# Patient Record
Sex: MALE | Race: WHITE | Employment: OTHER | URBAN - METROPOLITAN AREA
[De-identification: names, ages, dates, MRNs, and addresses within clinical notes are randomized per-mention and may not be internally consistent; named-entity substitution may affect disease eponyms.]

---

## 2017-01-04 ENCOUNTER — TELEPHONE (OUTPATIENT)
Dept: INTERNAL MEDICINE | Age: 62
End: 2017-01-04

## 2017-01-04 RX ORDER — SIMVASTATIN 80 MG
TABLET ORAL
Qty: 90 TABLET | Refills: 3 | Status: SHIPPED | OUTPATIENT
Start: 2017-01-04 | End: 2018-02-14 | Stop reason: SDUPTHER

## 2018-02-12 ENCOUNTER — OFFICE VISIT (OUTPATIENT)
Dept: INTERNAL MEDICINE | Age: 63
End: 2018-02-12

## 2018-02-12 VITALS
HEART RATE: 81 BPM | SYSTOLIC BLOOD PRESSURE: 138 MMHG | HEIGHT: 67 IN | OXYGEN SATURATION: 96 % | BODY MASS INDEX: 32.8 KG/M2 | DIASTOLIC BLOOD PRESSURE: 88 MMHG | WEIGHT: 209 LBS

## 2018-02-12 DIAGNOSIS — Z00.00 PREVENTATIVE HEALTH CARE: Primary | ICD-10-CM

## 2018-02-12 DIAGNOSIS — M25.552 LEFT HIP PAIN: ICD-10-CM

## 2018-02-12 DIAGNOSIS — L82.1 SEBORRHEIC KERATOSES: ICD-10-CM

## 2018-02-12 DIAGNOSIS — G43.109 OCULAR MIGRAINE: ICD-10-CM

## 2018-02-12 DIAGNOSIS — K29.70 GASTRITIS WITHOUT BLEEDING, UNSPECIFIED CHRONICITY, UNSPECIFIED GASTRITIS TYPE: ICD-10-CM

## 2018-02-12 DIAGNOSIS — E78.5 HYPERLIPIDEMIA, UNSPECIFIED HYPERLIPIDEMIA TYPE: ICD-10-CM

## 2018-02-12 DIAGNOSIS — R07.89 CHEST PAIN, ATYPICAL: ICD-10-CM

## 2018-02-12 PROCEDURE — 93000 ELECTROCARDIOGRAM COMPLETE: CPT | Performed by: INTERNAL MEDICINE

## 2018-02-12 PROCEDURE — 99396 PREV VISIT EST AGE 40-64: CPT | Performed by: INTERNAL MEDICINE

## 2018-02-12 RX ORDER — CLOTRIMAZOLE AND BETAMETHASONE DIPROPIONATE 10; .64 MG/G; MG/G
CREAM TOPICAL
Qty: 15 G | Refills: 1 | Status: SHIPPED | OUTPATIENT
Start: 2018-02-12 | End: 2019-02-13

## 2018-02-12 ASSESSMENT — PATIENT HEALTH QUESTIONNAIRE - PHQ9
SUM OF ALL RESPONSES TO PHQ9 QUESTIONS 1 & 2: 0
2. FEELING DOWN, DEPRESSED OR HOPELESS: 0
1. LITTLE INTEREST OR PLEASURE IN DOING THINGS: 0
SUM OF ALL RESPONSES TO PHQ QUESTIONS 1-9: 0

## 2018-02-12 ASSESSMENT — ENCOUNTER SYMPTOMS
GASTROINTESTINAL NEGATIVE: 1
RESPIRATORY NEGATIVE: 1
ALLERGIC/IMMUNOLOGIC NEGATIVE: 1
TROUBLE SWALLOWING: 0
EYES NEGATIVE: 1

## 2018-02-12 NOTE — ASSESSMENT & PLAN NOTE
Overall doing well. See problem list. Refer to GI for combination of routine screening and checkup. Labs reviewed.

## 2018-02-12 NOTE — ASSESSMENT & PLAN NOTE
T-Gel helps tear. He also has some ill-defined rash that itches on his abdomen and axilla. Try Lotrisone cream. If no benefit then follow-up with dermatology.

## 2018-02-12 NOTE — ASSESSMENT & PLAN NOTE
Status post referral to orthopedics. Left hip joint OKsome bursitis only. Right hip prosthesis looks good on x-ray per orthopedics.

## 2018-02-12 NOTE — PROGRESS NOTES
SUBJECTIVE:    Patient ID: Jovany Joy is an 63 y.o. male. HPI: Patient here today for the f/u of chronic problems -- see Problem List and associated comments. New issues or complaints include (also see Assessment for more details):  Here for routine checkup. See problem list. Overall he feels well. Problems include occasional chest pain, rare ocular migraine, and a rash. Review of Systems   Constitutional: Negative. Negative for diaphoresis. HENT: Negative. Negative for trouble swallowing. Eyes: Negative. Respiratory: Negative. Cardiovascular: Positive for chest pain. Gastrointestinal: Negative. Genitourinary: Negative. Musculoskeletal: Negative. Skin: Positive for rash. Allergic/Immunologic: Negative. Neurological: Negative. Hematological: Negative. Psychiatric/Behavioral: Negative. OBJECTIVE:    /88 (Site: Left Arm, Position: Sitting, Cuff Size: Medium Adult)   Pulse 81   Ht 5' 7\" (1.702 m)   Wt 209 lb (94.8 kg)   SpO2 96%   BMI 32.73 kg/m²      Physical Exam   Constitutional: He is oriented to person, place, and time. He appears well-developed and well-nourished. No distress. HENT:   Head: Normocephalic and atraumatic. Right Ear: External ear normal.   Left Ear: External ear normal.   Mouth/Throat: Oropharynx is clear and moist. No oropharyngeal exudate. Eyes: EOM are normal. Pupils are equal, round, and reactive to light. Right eye exhibits no discharge. Left eye exhibits no discharge. No scleral icterus. Neck: Normal range of motion. Neck supple. No JVD present. Carotid bruit is not present. No tracheal deviation present. No thyromegaly present. Cardiovascular: Normal rate, regular rhythm, normal heart sounds and intact distal pulses. Exam reveals no gallop. No murmur heard. Pulses:       Carotid pulses are 2+ on the right side, and 2+ on the left side. Radial pulses are 2+ on the right side, and 2+ on the left side. Posterior tibial pulses are 2+ on the right side, and 2+ on the left side. Pulmonary/Chest: Effort normal and breath sounds normal. No stridor. No respiratory distress. He has no wheezes. He has no rales. Abdominal: Soft. Bowel sounds are normal. He exhibits no distension, no abdominal bruit and no mass. There is no hepatosplenomegaly. There is no tenderness. Hernia confirmed negative in the right inguinal area and confirmed negative in the left inguinal area. Genitourinary: Rectum normal, prostate normal, testes normal and penis normal. Rectal exam shows guaiac negative stool. Prostate is not enlarged and not tender. Right testis shows no mass and no tenderness. Left testis shows no mass and no tenderness. Circumcised. Musculoskeletal: He exhibits no edema. Lymphadenopathy:        Head (right side): No submandibular adenopathy present. Head (left side): No submandibular adenopathy present. He has no cervical adenopathy. Right: No inguinal and no supraclavicular adenopathy present. Left: No inguinal and no supraclavicular adenopathy present. Neurological: He is alert and oriented to person, place, and time. He has normal strength. He displays no atrophy and no tremor. No cranial nerve deficit. He exhibits normal muscle tone. Gait normal.   Reflex Scores:       Bicep reflexes are 2+ on the right side and 2+ on the left side. Patellar reflexes are 2+ on the right side and 2+ on the left side. Skin: Rash (Fine papular rashbilateral lower abdomen. Mild and bilateral axilla as well.) noted. He is not diaphoretic. No pallor. Psychiatric: He has a normal mood and affect. His speech is normal and behavior is normal. Judgment and thought content normal. Cognition and memory are normal.       ASSESSMENT:       Encounter Diagnoses   Name Primary?     Hyperlipidemia, unspecified hyperlipidemia type Yes    Preventative health care     Gastritis without bleeding, unspecified

## 2018-02-14 RX ORDER — SIMVASTATIN 80 MG
TABLET ORAL
Qty: 90 TABLET | Refills: 3 | Status: SHIPPED | OUTPATIENT
Start: 2018-02-14 | End: 2019-02-03 | Stop reason: SDUPTHER

## 2018-02-20 ENCOUNTER — OFFICE VISIT (OUTPATIENT)
Dept: DERMATOLOGY | Age: 63
End: 2018-02-20

## 2018-02-20 DIAGNOSIS — L57.8 PHOTOAGING OF SKIN: ICD-10-CM

## 2018-02-20 DIAGNOSIS — L85.3 XEROSIS OF SKIN: ICD-10-CM

## 2018-02-20 DIAGNOSIS — L30.9 HAND ECZEMA: ICD-10-CM

## 2018-02-20 DIAGNOSIS — L73.9 FOLLICULITIS: Primary | ICD-10-CM

## 2018-02-20 DIAGNOSIS — Z78.9 NON-TOBACCO USER: ICD-10-CM

## 2018-02-20 PROCEDURE — 99202 OFFICE O/P NEW SF 15 MIN: CPT | Performed by: DERMATOLOGY

## 2018-02-20 RX ORDER — CICLOPIROX 1 G/100ML
SHAMPOO TOPICAL
Qty: 120 ML | Refills: 2 | Status: SHIPPED | OUTPATIENT
Start: 2018-02-20 | End: 2019-02-13

## 2018-02-20 NOTE — PROGRESS NOTES
[facility]) Dermatology  [address], [place], [address]       Bhavin Muñiz  1955    63 y.o. male     Date of Visit: 2018    Chief Complaint:   Chief Complaint   Patient presents with    New Patient     Itching on scalp, abdomen. PCP prescribed Lotrisol for itching on the abdomen amd it has helped witht he abdomen. Pt. uses T-gel on the scalp and that helps temporary. Itching on back and fingers. I was asked to see this patient by Dr. Paulson ref. provider found. History of Present Illness:  Bhavin Muñiz is a 63 y.o. male who presents with the chief complaint of establish care and for the followin. Complains of intermittent itching to his scalp with small bumps that appear intermittently to top of scalp and back of scalp, occurring for at least one year. He uses over-the-counter Neutrogena T/Gel shampoo to scalp 0-2 times per week as needed and this helps decrease itching temporarily (for about 3 days at one time). 2. Complains of pruritic bumps located to abdomen for several months. Primary care doctor prescribed Lotrisone to apply to his abdomen daily for 1 week and the bumps have resolved. He does not moisturize regularly. He does wash body with Dove soap daily  3. Complains of dry rough feeling hands with mild intermittent itching over the last several months. Patient use Lotrisone cream prescribed for his abdomen to his hands last few days and it is since taking care of the itch. Hands remain dry. Does not moisturize regularly. Does not believe he washes hands too frequently. Also states his legs have flaky skin and itch intermittently. Admits to sun exposure in youth without wearing sunscreen, hats, or protective clothing. Review of Systems:  Constitutional: Reports general sense of well-being   Skin: No new or changing moles, no history of keloids or hypertrophic scars. Heme: No abnormal bruising or bleeding.     Past Medical History, Family History, Surgical daily, do not take more than 1 shower daily with warm water and spend less than 10 minutes in shower/bath, pat dry and then apply thick moisturizer like OTC CeraVe cream in jar twice daily. Avoid fragrances and dyes and use only fragrance free detergents. 4.  Photoaging of skin  -Patient's skin showing evidence of chronic sun exposure leading to diffuse photodamage and photo-aging  -Educated patient that chronic sun exposure leads to increased risk for skin cancers and to have at least annual skin exams (earlier if indicated) in the office.   -The patient was counseled regarding sun protective practices such as sunscreen use (water resistant, broad spectrum sunscreen with SPF rating of at least 30) and need for reapplication at least every 2 hours, sun protective clothing (including hat and sunglasses), avoidance of sun during the peak hours of the day, and avoidance of tanning beds. RTC in 1-2 months for TBSE    5. Non-tobacco user  -continue with tobacco cessation        Note is transcribed using voice recognition software. Inadvertent computerized transcription errors may be present. Return if symptoms worsen or fail to improve.

## 2019-01-04 ENCOUNTER — TELEPHONE (OUTPATIENT)
Dept: INTERNAL MEDICINE CLINIC | Age: 64
End: 2019-01-04

## 2019-01-29 ENCOUNTER — TELEPHONE (OUTPATIENT)
Dept: INTERNAL MEDICINE CLINIC | Age: 64
End: 2019-01-29

## 2019-02-04 RX ORDER — SIMVASTATIN 80 MG
TABLET ORAL
Qty: 90 TABLET | Refills: 3 | Status: SHIPPED | OUTPATIENT
Start: 2019-02-04 | End: 2019-03-13 | Stop reason: SDUPTHER

## 2019-02-05 ENCOUNTER — TELEPHONE (OUTPATIENT)
Dept: FAMILY MEDICINE CLINIC | Age: 64
End: 2019-02-05

## 2019-02-05 NOTE — TELEPHONE ENCOUNTER
I believe he does and he has appt with MD on 2/13/2019 so new insurance will be put in at that time.

## 2019-02-13 ENCOUNTER — OFFICE VISIT (OUTPATIENT)
Dept: INTERNAL MEDICINE CLINIC | Age: 64
End: 2019-02-13
Payer: COMMERCIAL

## 2019-02-13 ENCOUNTER — TELEPHONE (OUTPATIENT)
Dept: FAMILY MEDICINE CLINIC | Age: 64
End: 2019-02-13

## 2019-02-13 VITALS
SYSTOLIC BLOOD PRESSURE: 136 MMHG | OXYGEN SATURATION: 95 % | WEIGHT: 187 LBS | BODY MASS INDEX: 29.35 KG/M2 | DIASTOLIC BLOOD PRESSURE: 82 MMHG | HEIGHT: 67 IN | HEART RATE: 74 BPM

## 2019-02-13 DIAGNOSIS — Z12.11 SCREEN FOR COLON CANCER: ICD-10-CM

## 2019-02-13 DIAGNOSIS — R07.89 CHEST PAIN, ATYPICAL: ICD-10-CM

## 2019-02-13 DIAGNOSIS — E78.5 HYPERLIPIDEMIA, UNSPECIFIED HYPERLIPIDEMIA TYPE: ICD-10-CM

## 2019-02-13 DIAGNOSIS — K29.70 GASTRITIS WITHOUT BLEEDING, UNSPECIFIED CHRONICITY, UNSPECIFIED GASTRITIS TYPE: ICD-10-CM

## 2019-02-13 DIAGNOSIS — Z00.00 PREVENTATIVE HEALTH CARE: Primary | ICD-10-CM

## 2019-02-13 DIAGNOSIS — Z00.00 ROUTINE GENERAL MEDICAL EXAMINATION AT A HEALTH CARE FACILITY: ICD-10-CM

## 2019-02-13 DIAGNOSIS — N52.9 ERECTILE DYSFUNCTION, UNSPECIFIED ERECTILE DYSFUNCTION TYPE: ICD-10-CM

## 2019-02-13 PROCEDURE — 93000 ELECTROCARDIOGRAM COMPLETE: CPT | Performed by: INTERNAL MEDICINE

## 2019-02-13 PROCEDURE — 99396 PREV VISIT EST AGE 40-64: CPT | Performed by: INTERNAL MEDICINE

## 2019-02-13 PROCEDURE — 99213 OFFICE O/P EST LOW 20 MIN: CPT | Performed by: INTERNAL MEDICINE

## 2019-02-13 PROCEDURE — 82270 OCCULT BLOOD FECES: CPT | Performed by: INTERNAL MEDICINE

## 2019-02-13 RX ORDER — SILDENAFIL CITRATE 20 MG/1
TABLET ORAL
Qty: 90 TABLET | Refills: 3 | Status: SHIPPED | OUTPATIENT
Start: 2019-02-13 | End: 2021-01-31

## 2019-02-13 RX ORDER — OMEPRAZOLE 20 MG/1
20 CAPSULE, DELAYED RELEASE ORAL DAILY
COMMUNITY
End: 2021-05-03 | Stop reason: SDUPTHER

## 2019-02-13 ASSESSMENT — ENCOUNTER SYMPTOMS
RESPIRATORY NEGATIVE: 1
ALLERGIC/IMMUNOLOGIC NEGATIVE: 1
GASTROINTESTINAL NEGATIVE: 1

## 2019-02-13 ASSESSMENT — PATIENT HEALTH QUESTIONNAIRE - PHQ9
SUM OF ALL RESPONSES TO PHQ QUESTIONS 1-9: 0
1. LITTLE INTEREST OR PLEASURE IN DOING THINGS: 0
SUM OF ALL RESPONSES TO PHQ9 QUESTIONS 1 & 2: 0
2. FEELING DOWN, DEPRESSED OR HOPELESS: 0
SUM OF ALL RESPONSES TO PHQ QUESTIONS 1-9: 0

## 2019-03-12 ENCOUNTER — TELEPHONE (OUTPATIENT)
Dept: INTERNAL MEDICINE CLINIC | Age: 64
End: 2019-03-12

## 2019-03-13 ENCOUNTER — TELEPHONE (OUTPATIENT)
Dept: INTERNAL MEDICINE CLINIC | Age: 64
End: 2019-03-13

## 2019-03-13 RX ORDER — SIMVASTATIN 80 MG
TABLET ORAL
Qty: 90 TABLET | Refills: 3 | Status: SHIPPED | OUTPATIENT
Start: 2019-03-13 | End: 2020-02-24 | Stop reason: SDUPTHER

## 2019-10-08 RX ORDER — LORAZEPAM 1 MG/1
TABLET ORAL
Qty: 30 TABLET | Refills: 0 | OUTPATIENT
Start: 2019-10-08

## 2019-10-28 ENCOUNTER — TELEPHONE (OUTPATIENT)
Dept: INTERNAL MEDICINE CLINIC | Age: 64
End: 2019-10-28

## 2019-10-29 RX ORDER — IBUPROFEN 200 MG
200 TABLET ORAL EVERY 6 HOURS PRN
COMMUNITY
End: 2022-02-03

## 2019-11-04 ENCOUNTER — ANESTHESIA EVENT (OUTPATIENT)
Dept: ENDOSCOPY | Age: 64
End: 2019-11-04
Payer: COMMERCIAL

## 2019-11-05 ENCOUNTER — ANESTHESIA (OUTPATIENT)
Dept: ENDOSCOPY | Age: 64
End: 2019-11-05
Payer: COMMERCIAL

## 2019-11-05 ENCOUNTER — HOSPITAL ENCOUNTER (OUTPATIENT)
Age: 64
Setting detail: OUTPATIENT SURGERY
Discharge: HOME | End: 2019-11-05
Attending: INTERNAL MEDICINE | Admitting: INTERNAL MEDICINE
Payer: COMMERCIAL

## 2019-11-05 VITALS
DIASTOLIC BLOOD PRESSURE: 71 MMHG | OXYGEN SATURATION: 97 % | SYSTOLIC BLOOD PRESSURE: 101 MMHG | RESPIRATION RATE: 14 BRPM

## 2019-11-05 VITALS
BODY MASS INDEX: 26.39 KG/M2 | WEIGHT: 178.2 LBS | HEART RATE: 63 BPM | DIASTOLIC BLOOD PRESSURE: 71 MMHG | HEIGHT: 69 IN | SYSTOLIC BLOOD PRESSURE: 112 MMHG | TEMPERATURE: 96.8 F | RESPIRATION RATE: 18 BRPM | OXYGEN SATURATION: 98 %

## 2019-11-05 PROCEDURE — 6360000002 HC RX W HCPCS

## 2019-11-05 PROCEDURE — 3700000001 HC ADD 15 MINUTES (ANESTHESIA): Performed by: INTERNAL MEDICINE

## 2019-11-05 PROCEDURE — 7100000010 HC PHASE II RECOVERY - FIRST 15 MIN: Performed by: INTERNAL MEDICINE

## 2019-11-05 PROCEDURE — 2580000003 HC RX 258: Performed by: ANESTHESIOLOGY

## 2019-11-05 PROCEDURE — 7100000011 HC PHASE II RECOVERY - ADDTL 15 MIN: Performed by: INTERNAL MEDICINE

## 2019-11-05 PROCEDURE — 2500000003 HC RX 250 WO HCPCS

## 2019-11-05 PROCEDURE — 3609027000 HC COLONOSCOPY: Performed by: INTERNAL MEDICINE

## 2019-11-05 PROCEDURE — 3700000000 HC ANESTHESIA ATTENDED CARE: Performed by: INTERNAL MEDICINE

## 2019-11-05 RX ORDER — PROPOFOL 10 MG/ML
INJECTION, EMULSION INTRAVENOUS PRN
Status: DISCONTINUED | OUTPATIENT
Start: 2019-11-05 | End: 2019-11-05 | Stop reason: SDUPTHER

## 2019-11-05 RX ORDER — SODIUM CHLORIDE 9 MG/ML
INJECTION, SOLUTION INTRAVENOUS CONTINUOUS
Status: DISCONTINUED | OUTPATIENT
Start: 2019-11-05 | End: 2019-11-05 | Stop reason: HOSPADM

## 2019-11-05 RX ORDER — LIDOCAINE HYDROCHLORIDE 20 MG/ML
INJECTION, SOLUTION EPIDURAL; INFILTRATION; INTRACAUDAL; PERINEURAL PRN
Status: DISCONTINUED | OUTPATIENT
Start: 2019-11-05 | End: 2019-11-05 | Stop reason: SDUPTHER

## 2019-11-05 RX ORDER — SODIUM CHLORIDE 0.9 % (FLUSH) 0.9 %
10 SYRINGE (ML) INJECTION EVERY 12 HOURS SCHEDULED
Status: DISCONTINUED | OUTPATIENT
Start: 2019-11-05 | End: 2019-11-05 | Stop reason: HOSPADM

## 2019-11-05 RX ORDER — ONDANSETRON 2 MG/ML
4 INJECTION INTRAMUSCULAR; INTRAVENOUS
Status: DISCONTINUED | OUTPATIENT
Start: 2019-11-05 | End: 2019-11-05 | Stop reason: HOSPADM

## 2019-11-05 RX ORDER — SODIUM CHLORIDE 0.9 % (FLUSH) 0.9 %
10 SYRINGE (ML) INJECTION PRN
Status: DISCONTINUED | OUTPATIENT
Start: 2019-11-05 | End: 2019-11-05 | Stop reason: HOSPADM

## 2019-11-05 RX ADMIN — LIDOCAINE HYDROCHLORIDE 60 MG: 20 INJECTION, SOLUTION EPIDURAL; INFILTRATION; INTRACAUDAL; PERINEURAL at 07:59

## 2019-11-05 RX ADMIN — PROPOFOL 30 MG: 10 INJECTION, EMULSION INTRAVENOUS at 08:09

## 2019-11-05 RX ADMIN — PROPOFOL 50 MG: 10 INJECTION, EMULSION INTRAVENOUS at 08:07

## 2019-11-05 RX ADMIN — PROPOFOL 50 MG: 10 INJECTION, EMULSION INTRAVENOUS at 08:14

## 2019-11-05 RX ADMIN — PROPOFOL 50 MG: 10 INJECTION, EMULSION INTRAVENOUS at 08:11

## 2019-11-05 RX ADMIN — PROPOFOL 30 MG: 10 INJECTION, EMULSION INTRAVENOUS at 08:04

## 2019-11-05 RX ADMIN — SODIUM CHLORIDE: 0.9 INJECTION, SOLUTION INTRAVENOUS at 07:42

## 2019-11-05 RX ADMIN — PROPOFOL 100 MG: 10 INJECTION, EMULSION INTRAVENOUS at 07:59

## 2019-11-05 RX ADMIN — PROPOFOL 30 MG: 10 INJECTION, EMULSION INTRAVENOUS at 08:01

## 2019-11-05 ASSESSMENT — PAIN SCALES - WONG BAKER
WONGBAKER_NUMERICALRESPONSE: 0

## 2019-11-05 ASSESSMENT — PAIN SCALES - GENERAL
PAINLEVEL_OUTOF10: 0

## 2019-11-05 ASSESSMENT — PAIN - FUNCTIONAL ASSESSMENT: PAIN_FUNCTIONAL_ASSESSMENT: 0-10

## 2020-02-18 ENCOUNTER — OFFICE VISIT (OUTPATIENT)
Dept: INTERNAL MEDICINE CLINIC | Age: 65
End: 2020-02-18
Payer: COMMERCIAL

## 2020-02-18 VITALS
HEART RATE: 77 BPM | SYSTOLIC BLOOD PRESSURE: 110 MMHG | WEIGHT: 178 LBS | BODY MASS INDEX: 27.94 KG/M2 | OXYGEN SATURATION: 97 % | HEIGHT: 67 IN | DIASTOLIC BLOOD PRESSURE: 70 MMHG

## 2020-02-18 PROBLEM — G25.2 ACTION TREMOR: Status: ACTIVE | Noted: 2020-02-18

## 2020-02-18 PROBLEM — G89.29 CHRONIC LEFT SHOULDER PAIN: Status: ACTIVE | Noted: 2020-02-18

## 2020-02-18 PROBLEM — M25.512 CHRONIC LEFT SHOULDER PAIN: Status: ACTIVE | Noted: 2020-02-18

## 2020-02-18 PROBLEM — R07.89 CHEST PAIN, ATYPICAL: Status: RESOLVED | Noted: 2018-02-12 | Resolved: 2020-02-18

## 2020-02-18 PROCEDURE — 99396 PREV VISIT EST AGE 40-64: CPT | Performed by: INTERNAL MEDICINE

## 2020-02-18 PROCEDURE — 99213 OFFICE O/P EST LOW 20 MIN: CPT | Performed by: INTERNAL MEDICINE

## 2020-02-18 SDOH — ECONOMIC STABILITY: TRANSPORTATION INSECURITY
IN THE PAST 12 MONTHS, HAS THE LACK OF TRANSPORTATION KEPT YOU FROM MEDICAL APPOINTMENTS OR FROM GETTING MEDICATIONS?: NO

## 2020-02-18 SDOH — ECONOMIC STABILITY: FOOD INSECURITY: WITHIN THE PAST 12 MONTHS, YOU WORRIED THAT YOUR FOOD WOULD RUN OUT BEFORE YOU GOT MONEY TO BUY MORE.: NEVER TRUE

## 2020-02-18 SDOH — ECONOMIC STABILITY: INCOME INSECURITY: HOW HARD IS IT FOR YOU TO PAY FOR THE VERY BASICS LIKE FOOD, HOUSING, MEDICAL CARE, AND HEATING?: NOT HARD AT ALL

## 2020-02-18 SDOH — ECONOMIC STABILITY: TRANSPORTATION INSECURITY
IN THE PAST 12 MONTHS, HAS LACK OF TRANSPORTATION KEPT YOU FROM MEETINGS, WORK, OR FROM GETTING THINGS NEEDED FOR DAILY LIVING?: NO

## 2020-02-18 SDOH — ECONOMIC STABILITY: FOOD INSECURITY: WITHIN THE PAST 12 MONTHS, THE FOOD YOU BOUGHT JUST DIDN'T LAST AND YOU DIDN'T HAVE MONEY TO GET MORE.: NEVER TRUE

## 2020-02-18 ASSESSMENT — PATIENT HEALTH QUESTIONNAIRE - PHQ9
SUM OF ALL RESPONSES TO PHQ QUESTIONS 1-9: 0
SUM OF ALL RESPONSES TO PHQ QUESTIONS 1-9: 0
SUM OF ALL RESPONSES TO PHQ9 QUESTIONS 1 & 2: 0
2. FEELING DOWN, DEPRESSED OR HOPELESS: 0
1. LITTLE INTEREST OR PLEASURE IN DOING THINGS: 0

## 2020-02-18 ASSESSMENT — ENCOUNTER SYMPTOMS
GASTROINTESTINAL NEGATIVE: 1
TROUBLE SWALLOWING: 0
RESPIRATORY NEGATIVE: 1

## 2020-02-18 NOTE — ASSESSMENT & PLAN NOTE
Noted several months ago. Seems to be improving actually. Mostly a action tremor with use of his arms. Bilateral.  No intervention at this time. Patient willing to monitor.

## 2020-02-18 NOTE — PROGRESS NOTES
SUBJECTIVE:  Patient ID: Philippe Simmons is an 60 y.o. male. HPI: Patient here today for the f/u of chronic problems-- see Problem List and associated comments. New issues or complaints include (alsosee Assessment for more details): Here for his routine yearly checkup. For the most part he is doing very well. He has retired and feels more relaxed. He and his wife will be moving into a new home soon, and he feels he may go back for some type of work afterwards. He has some pain in his left shoulder for a few months. Does not remember any particular injury. His knees, especially his left knee, are giving him more problems lately. His left knee has been treated with arthroscopic surgery in the past.    He has noted intermittent tremor in his arms. This mostly occurs with fine movement and not at rest.  There is no family history for tremors. It started several months ago and he actually believes it is improving. It does not cause any problems in his activities. He occasionally gets some dry skin dermatitis needs refill on his triamcinolone. Colonoscopy done late last year was okay-he needs a repeat in 10 years. Review of Systems   Constitutional: Negative for activity change, appetite change and unexpected weight change. HENT: Negative for trouble swallowing. Eyes: Negative for visual disturbance. Respiratory: Negative. Cardiovascular: Negative. Gastrointestinal: Negative. Genitourinary: Negative for difficulty urinating and dysuria. Musculoskeletal: Positive for arthralgias. Skin: Positive for rash (Dry skin dermatitis). Neurological: Positive for tremors. Negative for headaches. Hematological: Negative. Psychiatric/Behavioral: Negative.         OBJECTIVE:    /70 (Site: Left Upper Arm, Position: Sitting, Cuff Size: Large Adult)   Pulse 77   Ht 5' 7\" (1.702 m)   Wt 178 lb (80.7 kg)   SpO2 97%   BMI 27.88 kg/m²      Physical Exam  Constitutional: General: He is not in acute distress. Appearance: He is well-developed. He is not diaphoretic. HENT:      Head: Normocephalic and atraumatic. Right Ear: External ear normal.      Left Ear: External ear normal.      Mouth/Throat:      Pharynx: No oropharyngeal exudate. Eyes:      General: No scleral icterus. Right eye: No discharge. Left eye: No discharge. Pupils: Pupils are equal, round, and reactive to light. Neck:      Musculoskeletal: Normal range of motion and neck supple. Thyroid: No thyromegaly. Vascular: No carotid bruit or JVD. Trachea: No tracheal deviation. Cardiovascular:      Rate and Rhythm: Normal rate and regular rhythm. Pulses:           Carotid pulses are 2+ on the right side and 2+ on the left side. Radial pulses are 2+ on the right side and 2+ on the left side. Posterior tibial pulses are 2+ on the right side and 2+ on the left side. Heart sounds: Normal heart sounds. No murmur. No gallop. Pulmonary:      Effort: Pulmonary effort is normal. No respiratory distress. Breath sounds: Normal breath sounds. No stridor. No wheezing or rales. Abdominal:      General: Bowel sounds are normal. There is no distension or abdominal bruit. Palpations: Abdomen is soft. There is no mass. Tenderness: There is no abdominal tenderness. Hernia: There is no hernia in the right inguinal area or left inguinal area. Genitourinary:     Penis: Normal and circumcised. Scrotum/Testes: Normal.         Right: Mass or tenderness not present. Left: Mass or tenderness not present. Prostate: Enlarged. Not tender. Rectum: Normal. Guaiac result negative. Musculoskeletal:      Right lower leg: No edema. Left lower leg: No edema. Comments: Some tenderness over left anterior shoulder- pain elicited with arm rotation and abduction greater than 90 degrees. Joint stable. No crepitus. Lymphadenopathy:      Head:      Right side of head: No submandibular adenopathy. Left side of head: No submandibular adenopathy. Cervical: No cervical adenopathy. Upper Body:      Right upper body: No supraclavicular adenopathy. Left upper body: No supraclavicular adenopathy. Lower Body: No right inguinal adenopathy. No left inguinal adenopathy. Skin:     Coloration: Skin is not jaundiced or pale. Findings: No rash. Neurological:      Mental Status: He is alert and oriented to person, place, and time. Cranial Nerves: No cranial nerve deficit. Motor: Tremor present. No atrophy or abnormal muscle tone. Coordination: Coordination normal.      Gait: Gait normal.      Deep Tendon Reflexes:      Reflex Scores:       Bicep reflexes are 2+ on the right side and 2+ on the left side. Patellar reflexes are 2+ on the right side and 2+ on the left side. Comments: Fine tremor bilateral upper extremities and hands with arms outstretched. Slight tremor noted when lifting a full glass of water. No cogwheeling. No spasticity. No rest tremor. Psychiatric:         Speech: Speech normal.         Behavior: Behavior normal.         Thought Content: Thought content normal.         Judgment: Judgment normal.         ASSESSMENT:       Encounter Diagnoses   Name Primary?  Preventative health care Yes    Hand eczema     Gastritis without bleeding, unspecified chronicity, unspecified gastritis type     Hyperlipidemia, unspecified hyperlipidemia type     Chronic left shoulder pain     S/P left knee arthroscopy, chondroplasty, synovectomy, partial medial and lateral meniscectomy 9/4/2015     Erectile dysfunction, unspecified erectile dysfunction type     Action tremor        Preventative health care  Here for routine checkup. Check PSA. Labs were done last year through his wife's insurance.     See problem list.    Colonoscopy done 2019-recheck 10 years. Gastritis  Omeprazole    Hyperlipidemia  Simvastatin-outside labs okay    Chronic left shoulder pain  Suspect rotator cuff or biceps tendinopathy. Refer to orthopedics. S/P left knee arthroscopy, chondroplasty, synovectomy, partial medial and lateral meniscectomy 9/4/2015  Occasionally still gives him some problems. He feels that is slowly worsening over the years. Gave referral for orthopedic evaluation if necessary. ED (erectile dysfunction)  Sildenafil works well. Action tremor  Noted several months ago. Seems to be improving actually. Mostly a action tremor with use of his arms. Bilateral.  No intervention at this time. Patient willing to monitor. PLAN:See ASSESSMENT for evaluation & PLAN     Orders Placed This Encounter   Procedures    TSH WITH REFLEX TO FT4     Standing Status:   Future     Standing Expiration Date:   2/17/2021    Psa screening     Standing Status:   Future     Standing Expiration Date:   2/17/2021       PSH, PMH, SH and FH reviewed and noted. Recent and past labs, tests and consultsalso reviewed. Recent or new meds also reviewed.

## 2020-02-24 ENCOUNTER — TELEPHONE (OUTPATIENT)
Dept: INTERNAL MEDICINE CLINIC | Age: 65
End: 2020-02-24

## 2020-02-24 RX ORDER — SIMVASTATIN 80 MG
TABLET ORAL
Qty: 90 TABLET | Refills: 3 | OUTPATIENT
Start: 2020-02-24 | End: 2021-01-25 | Stop reason: SDUPTHER

## 2020-02-24 RX ORDER — LORAZEPAM 1 MG/1
TABLET ORAL
Qty: 30 TABLET | Refills: 0 | OUTPATIENT
Start: 2020-02-24 | End: 2020-03-24

## 2020-06-17 ENCOUNTER — TELEPHONE (OUTPATIENT)
Dept: ORTHOPEDIC SURGERY | Age: 65
End: 2020-06-17

## 2020-07-29 ENCOUNTER — APPOINTMENT (OUTPATIENT)
Dept: CT IMAGING | Age: 65
End: 2020-07-29
Payer: COMMERCIAL

## 2020-07-29 ENCOUNTER — HOSPITAL ENCOUNTER (EMERGENCY)
Age: 65
Discharge: HOME | End: 2020-07-29
Payer: COMMERCIAL

## 2020-07-29 VITALS
HEART RATE: 76 BPM | BODY MASS INDEX: 26.07 KG/M2 | HEIGHT: 69 IN | TEMPERATURE: 98.2 F | SYSTOLIC BLOOD PRESSURE: 130 MMHG | OXYGEN SATURATION: 98 % | RESPIRATION RATE: 16 BRPM | DIASTOLIC BLOOD PRESSURE: 74 MMHG | WEIGHT: 176 LBS

## 2020-07-29 PROCEDURE — 70450 CT HEAD/BRAIN W/O DYE: CPT

## 2020-07-29 PROCEDURE — 12002 RPR S/N/AX/GEN/TRNK2.6-7.5CM: CPT

## 2020-07-29 PROCEDURE — 72125 CT NECK SPINE W/O DYE: CPT

## 2020-07-29 PROCEDURE — 90471 IMMUNIZATION ADMIN: CPT | Performed by: NURSE PRACTITIONER

## 2020-07-29 PROCEDURE — 99283 EMERGENCY DEPT VISIT LOW MDM: CPT

## 2020-07-29 PROCEDURE — 90715 TDAP VACCINE 7 YRS/> IM: CPT | Performed by: NURSE PRACTITIONER

## 2020-07-29 PROCEDURE — 6360000002 HC RX W HCPCS: Performed by: NURSE PRACTITIONER

## 2020-07-29 RX ADMIN — TETANUS TOXOID, REDUCED DIPHTHERIA TOXOID AND ACELLULAR PERTUSSIS VACCINE, ADSORBED 0.5 ML: 5; 2.5; 8; 8; 2.5 SUSPENSION INTRAMUSCULAR at 18:33

## 2020-07-29 ASSESSMENT — ENCOUNTER SYMPTOMS
DIARRHEA: 0
SHORTNESS OF BREATH: 0
VOMITING: 0
NAUSEA: 0
ABDOMINAL PAIN: 0
CHEST TIGHTNESS: 0

## 2020-07-29 NOTE — ED PROVIDER NOTES
[address] [facility]        Pt Name: Caty Rittre  MRN: [id number]  [place] 1955  Date of evaluation: 7/29/2020  Provider: AVELINO Ricardo - HERSON  PCP: Louis Doty MD    Evaluation by CITLALI. My supervising physician was available for consultation. CHIEF COMPLAINT       Chief Complaint   Patient presents with    Head Laceration     head laceration a wood entertainment center fell on head. denies loc. HISTORY OF PRESENT ILLNESS   (Location, Timing/Onset, Context/Setting, Quality, Duration, Modifying Factors, Severity, Associated Signs and Symptoms)  Note limiting factors. Caty Ritter is a 63 y.o. male presents to the emergency department complaining of left-sided scalp laceration. Patient reports that he was breaking up an entertainment center with a sledgehammer when a piece accidentally slipped, lacerating the left parietal scalp. There is a 4 cm stellate laceration, bleeding controlled. Not on any blood thinners. He is chronic neck pain, no better or worse today. There is no loss of consciousness. Denies any fever, lightheadedness, dizziness, visual disturbances. No chest pain or pressure. No neck pain. No shortness of breath, cough, or congestion. No abdominal pain, nausea, vomiting, diarrhea, constipation, or dysuria. No rash. Nursing Notes were all reviewed and agreed with or any disagreements were addressed in the HPI. REVIEW OF SYSTEMS    (2-9 systems for level 4, 10 or more for level 5)     Review of Systems   Constitutional: Negative for activity change, chills and fever. Respiratory: Negative for chest tightness and shortness of breath. Cardiovascular: Negative for chest pain. Gastrointestinal: Negative for abdominal pain, diarrhea, nausea and vomiting. Genitourinary: Negative for dysuria. Musculoskeletal: Positive for neck pain. Skin: Positive for wound.    All other systems reviewed and are negative. Positives and Pertinent negatives as per HPI. Except as noted above in the ROS, all other systems were reviewed and negative. PAST MEDICAL HISTORY     Past Medical History:   Diagnosis Date    Arthritis     GERD (gastroesophageal reflux disease) in past    Hyperlipidemia     Knee gives out left    S/P colonoscopy 2009    nl - check 10 yrs         SURGICAL HISTORY     Past Surgical History:   Procedure Laterality Date    ANTERIOR CRUCIATE LIGAMENT REPAIR Right 1978    ACL repair    COLONOSCOPY  2007    normal    COLONOSCOPY N/A 11/5/2019    Diverticulosis. Recheck 10 years. [facility]/ [address]  2003    right hip    KNEE ARTHROSCOPY Left 9/4/2015    LEFT KNEE ARTHROSCOPY CHONDROPLASTY SYNOVECTOMY PARTIAL         CURRENTMEDICATIONS       Previous Medications    IBUPROFEN (ADVIL;MOTRIN) 200 MG TABLET    Take 200 mg by mouth every 6 hours as needed for Pain    OMEPRAZOLE (PRILOSEC) 20 MG DELAYED RELEASE CAPSULE    Take 20 mg by mouth daily    SILDENAFIL (REVATIO) 20 MG TABLET    Take 2 to 5 tabs prn    SIMVASTATIN (ZOCOR) 80 MG TABLET    TAKE 1 TABLET BY MOUTH IN THE EVENING         ALLERGIES     Patient has no known allergies. FAMILYHISTORY       Family History   Problem Relation Age of Onset    Coronary Art Dis Father     Coronary Art Dis Brother           SOCIAL HISTORY       Social History     Tobacco Use    Smoking status: Never Smoker    Smokeless tobacco: Never Used   Substance Use Topics    Alcohol use: Yes     Alcohol/week: 0.0 standard drinks     Comment: beer and wine 1-3 weekend     Drug use: No       SCREENINGS             PHYSICAL EXAM    (up to 7 for level 4, 8 or more for level 5)     ED Triage Vitals [07/29/20 1642]   BP Temp Temp Source Pulse Resp SpO2 Height Weight   130/74 98.2 °F (36.8 °C) Temporal 76 16 98 % 5' 9\" (1.753 m) 176 lb (79.8 kg)       Physical Exam  Vitals signs and nursing note reviewed.    Constitutional:       Appearance: He is well-developed. He is not diaphoretic. HENT:      Head: Normocephalic. Right Ear: External ear normal.      Left Ear: External ear normal.   Eyes:      General:         Right eye: No discharge. Left eye: No discharge. Neck:      Musculoskeletal: Normal range of motion and neck supple. Vascular: No JVD. Cardiovascular:      Rate and Rhythm: Normal rate and regular rhythm. Pulses: Normal pulses. Heart sounds: Normal heart sounds. Pulmonary:      Effort: Pulmonary effort is normal. No respiratory distress. Breath sounds: Normal breath sounds. Abdominal:      Palpations: Abdomen is soft. Musculoskeletal: Normal range of motion. Skin:     General: Skin is warm and dry. Coloration: Skin is not pale. Neurological:      Mental Status: He is alert and oriented to person, place, and time. Psychiatric:         Behavior: Behavior normal.         DIAGNOSTIC RESULTS   LABS:    Labs Reviewed - No data to display    All other labs were within normal range or not returned as of this dictation. EKG: All EKG's are interpreted by the Emergency Department Physician in the absence of a cardiologist.  Please see their note for interpretation of EKG. RADIOLOGY:   Non-plain film images such as CT, Ultrasound and MRI are read by the radiologist. Plain radiographic images are visualized and preliminarily interpreted by the ED Provider with the below findings:        Interpretation per the Radiologist below, if available at the time of this note:    CT CERVICAL SPINE WO CONTRAST   Final Result   Impression:      No acute intracranial abnormality. No acute abnormality of the cervical spine. Multilevel facet hypertrophy changes and degenerative disc disease result in   moderate central canal narrowing, particularly at C4-5 and C5-6.       Marked capsular hypertrophy changes at the atlantoaxial joint are present   which results in severe upper cervical canal narrowing with central atrophy. Is also mild nonspecific low-attenuation within the periventricular subcortical white matter, likely representing chronic ischemic microvascular change. ORBITS: The visualized portion of the orbits demonstrate no acute abnormality. SINUSES: The visualized paranasal sinuses and mastoid air cells demonstrate no acute abnormality. SOFT TISSUES/SKULL:  No acute abnormality of the visualized skull or soft tissues. Cervical spine CT: Bones/alignment: There is reversal of normal cervical lordosis. There is preservation of vertebral body heights. No acute fracture. Degenerative changes: At least moderate multilevel degenerative disc disease identified, with disc space narrowing, endplate sclerosis and spurring. Facet hypertrophy changes are also present, particularly prominent at C2-3 and C3-4. There is marked capsular hypertrophy changes involving the lateral axial joint, which are greater toward the left and causes focal severe narrowing of the upper cervical spinal canal; the spinal canal measures 5-6 mm in AP dimension; the left lateral recess is effaced. There is also moderate multilevel central canal narrowing of the cervical spine, particularly its at C4-5 and C5-6, largely due to posterior spurring. Soft tissues: No paraspinal hematoma is identified. A. Calcifications of the carotid bifurcations are evident. Impression: No acute intracranial abnormality. No acute abnormality of the cervical spine. Multilevel facet hypertrophy changes and degenerative disc disease result in moderate central canal narrowing, particularly at C4-5 and C5-6. Marked capsular hypertrophy changes at the atlantoaxial joint are present which results in severe upper cervical canal narrowing with effacement of the left lateral recess.      Ct Cervical Spine Wo Contrast    Result Date: 7/29/2020  EXAMINATION: CT OF THE HEAD WITHOUT CONTRAST; CT OF THE CERVICAL SPINE WITHOUT CONTRAST 7/29/2020 5:16 pm TECHNIQUE: CT of the endplate sclerosis and spurring. Facet hypertrophy changes are also present, particularly prominent at C2-3 and C3-4. There is marked capsular hypertrophy changes involving the lateral axial joint, which are greater toward the left and causes focal severe narrowing of the upper cervical spinal canal; the spinal canal measures 5-6 mm in AP dimension; the left lateral recess is effaced. There is also moderate multilevel central canal narrowing of the cervical spine, particularly its at C4-5 and C5-6, largely due to posterior spurring. Soft tissues: No paraspinal hematoma is identified. A. Calcifications of the carotid bifurcations are evident. Impression: No acute intracranial abnormality. No acute abnormality of the cervical spine. Multilevel facet hypertrophy changes and degenerative disc disease result in moderate central canal narrowing, particularly at C4-5 and C5-6. Marked capsular hypertrophy changes at the atlantoaxial joint are present which results in severe upper cervical canal narrowing with effacement of the left lateral recess. PROCEDURES   Unless otherwise noted below, none     Lac Repair    Date/Time: 7/29/2020 6:33 PM  Performed by: AVELINO Warren CNP  Authorized by: AVELINO Warren CNP     Consent:     Consent obtained:  Verbal    Consent given by:  Patient    Risks discussed:  Infection, need for additional repair, poor cosmetic result, tendon damage, vascular damage, poor wound healing and nerve damage  Anesthesia (see MAR for exact dosages):      Anesthesia method:  None  Laceration details:     Location:  Scalp    Scalp location:  L parietal    Length (cm):  4  Repair type:     Repair type:  Simple  Pre-procedure details:     Preparation:  Patient was prepped and draped in usual sterile fashion  Exploration:     Wound exploration: wound explored through full range of motion      Contaminated: no    Treatment:     Area cleansed with:  Hibiclens    Amount of cleaning:  Standard  Skin repair:     Repair method:  Staples    Number of staples:  6  Approximation:     Approximation:  Close  Post-procedure details:     Dressing:  Open (no dressing)    Patient tolerance of procedure: Tolerated well, no immediate complications        CRITICAL CARE TIME   N/A    CONSULTS:  None      EMERGENCY DEPARTMENT COURSE and DIFFERENTIAL DIAGNOSIS/MDM:   Vitals:    Vitals:    07/29/20 1642   BP: 130/74   Pulse: 76   Resp: 16   Temp: 98.2 °F (36.8 °C)   TempSrc: Temporal   SpO2: 98%   Weight: 176 lb (79.8 kg)   Height: 5' 9\" (1.753 m)       Patient was given the following medications:  Medications   Tetanus-Diphth-Acell Pertussis (BOOSTRIX) injection 0.5 mL (0.5 mLs Intramuscular Given 7/29/20 1833)           Briefly, this is a 63year old male that  presents to the emergency department complaining of left-sided scalp laceration. Patient reports that he was breaking up an entertainment center with a sledgehammer when a piece accidentally slipped, lacerating the left parietal scalp. There is a 4 cm stellate laceration, bleeding controlled. Not on any blood thinners. He is chronic neck pain, no better or worse today. There is no loss of consciousness. Staples should be removed in about a week. He was given the CT report of his neck, he has chronic neck pain. Instructed to follow-up with primary care. I estimate there is LOW risk for SKULL FRACTURE, INTRACRANIAL HEMORRHAGE, CERVICAL SPINE INJURY, SUBDURAL OR EPIDURAL HEMATOMA,  thus I consider the discharge disposition reasonable. FINAL IMPRESSION      1. Laceration of scalp, initial encounter    2.  Abnormal CT scan, cervical spine          DISPOSITION/PLAN   DISPOSITION Decision To Discharge 07/29/2020 06:13:34 PM      PATIENT REFERREDTO:  Joshua Patel MD  [address]  [place] [address] [address] [facility]  [phone]    Schedule an appointment as soon as possible for a visit in 1 week  For suture removal      DISCHARGE MEDICATIONS:  New Prescriptions    No medications on file       DISCONTINUED MEDICATIONS:  Discontinued Medications    No medications on file              (Please note that portions of this note were completed with a voice recognition program.  Efforts were made to edit the dictations but occasionally words are mis-transcribed.)    AVELINO Davenport CNP (electronically signed)           AVELINO Davenport CNP  07/29/20 8211

## 2020-08-05 ENCOUNTER — OFFICE VISIT (OUTPATIENT)
Dept: INTERNAL MEDICINE CLINIC | Age: 65
End: 2020-08-05
Payer: COMMERCIAL

## 2020-08-05 VITALS
SYSTOLIC BLOOD PRESSURE: 138 MMHG | TEMPERATURE: 97.5 F | BODY MASS INDEX: 28 KG/M2 | DIASTOLIC BLOOD PRESSURE: 72 MMHG | WEIGHT: 178.4 LBS | HEIGHT: 67 IN

## 2020-08-05 PROBLEM — M48.9 CERVICAL SPINE DISEASE: Status: ACTIVE | Noted: 2020-08-05

## 2020-08-05 PROBLEM — S01.01XA LACERATION OF SKIN OF SCALP: Status: ACTIVE | Noted: 2020-08-05

## 2020-08-05 PROCEDURE — 99214 OFFICE O/P EST MOD 30 MIN: CPT | Performed by: INTERNAL MEDICINE

## 2020-08-05 RX ORDER — MELOXICAM 15 MG/1
15 TABLET ORAL DAILY
COMMUNITY
End: 2021-05-03 | Stop reason: ALTCHOICE

## 2020-08-05 ASSESSMENT — ENCOUNTER SYMPTOMS
RESPIRATORY NEGATIVE: 1
BACK PAIN: 1
NAUSEA: 0

## 2020-08-05 NOTE — PROGRESS NOTES
SUBJECTIVE:  Patient ID: Bebe Duke is an 63 y.o. male. HPI: Patient here today for the f/u of chronic problems-- see Problem List and associated comments. New issues or complaints include (also see Assessment for more details): Approximately 1 week ago patient was struck in the head by a piece of furniture as was falling. He sustained a laceration to his left parietal scalp area. He was seen in the emergency room where the wound was cleaned and stapled shut. He has done well from that point of view. CT of his head was unremarkable. He did have a CT scan of his cervical spine which showed significant upper cervical spinal stenosis and degenerative disease with atlantoaxial narrowing. He does describe occasional electric shocks down his arms but these are brief. He has no generalized numbness or weakness. Occasionally he also gets some symptoms in his legs but these are also very brief. He does note that he has known cervical spinal disease from past evaluation. He is wondering if he should get this evaluated or surgically corrected while he is still relatively young and healthy. Review of Systems   Constitutional: Negative. Eyes: Negative for visual disturbance. Respiratory: Negative. Cardiovascular: Negative. Gastrointestinal: Negative for nausea. Musculoskeletal: Positive for back pain and neck pain. Negative for gait problem and neck stiffness. Skin: Positive for wound. Neurological: Negative for weakness and numbness. OBJECTIVE:    /72 (Site: Left Upper Arm)   Temp 97.5 °F (36.4 °C)   Ht 5' 7\" (1.702 m)   Wt 178 lb 6.4 oz (80.9 kg)   BMI 27.94 kg/m²      Physical Exam  Constitutional:       Appearance: Normal appearance. HENT:      Head:      Comments: Left parietal area- well approximated laceration with 6 staples. Staples removed without difficulty. Wound looks good. Eyes:      Extraocular Movements: Extraocular movements intact.    Neck: Musculoskeletal: Normal range of motion and neck supple. No neck rigidity. Cardiovascular:      Rate and Rhythm: Normal rate and regular rhythm. Lymphadenopathy:      Cervical: No cervical adenopathy. Neurological:      General: No focal deficit present. Mental Status: He is alert and oriented to person, place, and time. Cranial Nerves: Cranial nerves are intact. No cranial nerve deficit. Motor: No tremor. Coordination: Coordination normal.      Gait: Gait normal.   Psychiatric:         Mood and Affect: Mood normal.         Thought Content: Thought content normal.         ASSESSMENT:       Encounter Diagnoses   Name Primary?  Laceration of skin of scalp, subsequent encounter     Cervical spine disease        Cervical spine disease  Atlantoaxial narrowing 5 to 6 mm. Occasional radicular symptoms. Doubt instability. Refer to [facility] neurosurgery for opinion. Laceration of skin of scalp  Sutures removed. Patient tolerated well. Wound healing well. Instructions given. PLAN:See ASSESSMENT for evaluation & PLAN     Orders Placed This Encounter   Procedures     - NE REMOVAL OF SUTURES     , PMH, SH and FH reviewed and noted. Recent and past labs, tests and consultsalso reviewed. Recent or new meds also reviewed.

## 2020-08-05 NOTE — ASSESSMENT & PLAN NOTE
Atlantoaxial narrowing 5 to 6 mm. Occasional radicular symptoms. Doubt instability. Refer to [facility] neurosurgery for opinion.

## 2021-01-25 RX ORDER — SIMVASTATIN 80 MG
TABLET ORAL
Qty: 90 TABLET | Refills: 3 | Status: SHIPPED | OUTPATIENT
Start: 2021-01-25 | End: 2022-01-04 | Stop reason: SDUPTHER

## 2021-01-25 NOTE — TELEPHONE ENCOUNTER
Pt states [facility] is trying to get a rx filled for simvastatin 90 day please advise pt he is frystrated and would like to know why he is not getting his rx filled.      [place] [facility]  [phone]

## 2021-01-31 RX ORDER — SILDENAFIL CITRATE 20 MG/1
TABLET ORAL
Qty: 90 TABLET | Refills: 2 | Status: SHIPPED | OUTPATIENT
Start: 2021-01-31 | End: 2021-05-03 | Stop reason: ALTCHOICE

## 2021-03-09 ENCOUNTER — OFFICE VISIT (OUTPATIENT)
Dept: INTERNAL MEDICINE CLINIC | Age: 66
End: 2021-03-09
Payer: COMMERCIAL

## 2021-03-09 VITALS
TEMPERATURE: 98 F | SYSTOLIC BLOOD PRESSURE: 130 MMHG | BODY MASS INDEX: 29.66 KG/M2 | HEIGHT: 67 IN | DIASTOLIC BLOOD PRESSURE: 70 MMHG | WEIGHT: 189 LBS

## 2021-03-09 DIAGNOSIS — Z01.818 PREOP EXAMINATION: Primary | ICD-10-CM

## 2021-03-09 DIAGNOSIS — E78.5 HYPERLIPIDEMIA, UNSPECIFIED HYPERLIPIDEMIA TYPE: ICD-10-CM

## 2021-03-09 DIAGNOSIS — Z12.5 SPECIAL SCREENING FOR MALIGNANT NEOPLASM OF PROSTATE: ICD-10-CM

## 2021-03-09 DIAGNOSIS — R13.13 PHARYNGEAL DYSPHAGIA: ICD-10-CM

## 2021-03-09 DIAGNOSIS — M48.9 CERVICAL SPINE DISEASE: ICD-10-CM

## 2021-03-09 DIAGNOSIS — Z01.818 PRE-OP EXAM: ICD-10-CM

## 2021-03-09 DIAGNOSIS — Z01.818 PREOP EXAMINATION: ICD-10-CM

## 2021-03-09 PROBLEM — S01.01XA LACERATION OF SKIN OF SCALP: Status: RESOLVED | Noted: 2020-08-05 | Resolved: 2021-03-09

## 2021-03-09 LAB
BASOPHILS ABSOLUTE: 0 K/UL (ref 0–0.2)
BASOPHILS RELATIVE PERCENT: 0.7 %
EOSINOPHILS ABSOLUTE: 0.2 K/UL (ref 0–0.6)
EOSINOPHILS RELATIVE PERCENT: 2.6 %
HCT VFR BLD CALC: 42.5 % (ref 40.5–52.5)
HEMOGLOBIN: 14.5 G/DL (ref 13.5–17.5)
LYMPHOCYTES ABSOLUTE: 1.3 K/UL (ref 1–5.1)
LYMPHOCYTES RELATIVE PERCENT: 21.8 %
MCH RBC QN AUTO: 35.5 PG (ref 26–34)
MCHC RBC AUTO-ENTMCNC: 34.1 G/DL (ref 31–36)
MCV RBC AUTO: 104 FL (ref 80–100)
MONOCYTES ABSOLUTE: 0.6 K/UL (ref 0–1.3)
MONOCYTES RELATIVE PERCENT: 9.5 %
NEUTROPHILS ABSOLUTE: 3.9 K/UL (ref 1.7–7.7)
NEUTROPHILS RELATIVE PERCENT: 65.4 %
PDW BLD-RTO: 13.5 % (ref 12.4–15.4)
PLATELET # BLD: 238 K/UL (ref 135–450)
PMV BLD AUTO: 8.6 FL (ref 5–10.5)
RBC # BLD: 4.09 M/UL (ref 4.2–5.9)
WBC # BLD: 6 K/UL (ref 4–11)

## 2021-03-09 PROCEDURE — 99242 OFF/OP CONSLTJ NEW/EST SF 20: CPT | Performed by: INTERNAL MEDICINE

## 2021-03-09 PROCEDURE — 93000 ELECTROCARDIOGRAM COMPLETE: CPT | Performed by: INTERNAL MEDICINE

## 2021-03-09 RX ORDER — ACETAMINOPHEN 325 MG/1
650 TABLET ORAL EVERY 6 HOURS PRN
COMMUNITY
End: 2022-02-03

## 2021-03-09 ASSESSMENT — ENCOUNTER SYMPTOMS
GASTROINTESTINAL NEGATIVE: 1
EYE DISCHARGE: 0
TROUBLE SWALLOWING: 1
RESPIRATORY NEGATIVE: 1

## 2021-03-09 ASSESSMENT — PATIENT HEALTH QUESTIONNAIRE - PHQ9
1. LITTLE INTEREST OR PLEASURE IN DOING THINGS: 0
SUM OF ALL RESPONSES TO PHQ QUESTIONS 1-9: 0
SUM OF ALL RESPONSES TO PHQ QUESTIONS 1-9: 0
2. FEELING DOWN, DEPRESSED OR HOPELESS: 0

## 2021-03-09 NOTE — ASSESSMENT & PLAN NOTE
Schedule left knee total arthroplasty - OK FOR SURGERY. OK for planned procedure. Take medications as directed.

## 2021-03-09 NOTE — PROGRESS NOTES
Subjective:      Patient ID: Junior Goff is a 63 y.o. male. HPI Patient here today for preoperative evaluation. Patient scheduled for left total knee replacement. He is status post cervical spine surgery. No signs or symptoms of Covid infection. See Assessment for comments on acute and chronic medical problems and clearance evaluation. Review of Systems   Constitutional: Negative for fever. HENT: Positive for trouble swallowing. Eyes: Negative for discharge. Respiratory: Negative. Cardiovascular: Negative. Gastrointestinal: Negative. Genitourinary: Negative. Musculoskeletal: Positive for arthralgias. Left lateral hip pain   Skin: Negative for rash and wound. Allergic/Immunologic: Negative for immunocompromised state. Neurological: Negative. Hematological: Negative for adenopathy. Does not bruise/bleed easily. Psychiatric/Behavioral: Negative. Objective:   Physical Exam  Constitutional:       General: He is not in acute distress. Appearance: Normal appearance. He is not ill-appearing, toxic-appearing or diaphoretic. HENT:      Mouth/Throat:      Pharynx: No oropharyngeal exudate or posterior oropharyngeal erythema. Eyes:      General: No scleral icterus. Right eye: No discharge. Left eye: No discharge. Extraocular Movements: Extraocular movements intact. Neck:      Musculoskeletal: Decreased range of motion. No pain with movement. Vascular: No carotid bruit. Comments: Posterior cervical scar intact  Cardiovascular:      Rate and Rhythm: Normal rate and regular rhythm. Pulses: Normal pulses. Carotid pulses are 2+ on the right side and 2+ on the left side. Radial pulses are 2+ on the right side and 2+ on the left side. Posterior tibial pulses are 2+ on the right side and 2+ on the left side. Heart sounds: Normal heart sounds. No murmur. No gallop.     Pulmonary:      Effort: Pulmonary effort is normal. No respiratory distress. Breath sounds: Normal breath sounds. No wheezing or rales. Abdominal:      General: Bowel sounds are normal. There is no abdominal bruit. Palpations: Abdomen is soft. There is no hepatomegaly or splenomegaly. Musculoskeletal:      Right lower leg: No edema. Left lower leg: No edema. Lymphadenopathy:      Head:      Right side of head: No submandibular adenopathy. Left side of head: No submandibular adenopathy. Cervical: No cervical adenopathy. Upper Body:      Right upper body: No supraclavicular adenopathy. Left upper body: No supraclavicular adenopathy. Skin:     Coloration: Skin is not jaundiced or pale. Neurological:      General: No focal deficit present. Mental Status: He is alert and oriented to person, place, and time. Cranial Nerves: Cranial nerves are intact. No cranial nerve deficit. Motor: No tremor. Coordination: Coordination is intact. Gait: Gait is intact. Comments: SLR negative   Psychiatric:         Attention and Perception: Attention normal.         Mood and Affect: Mood normal.         Speech: Speech normal.         Behavior: Behavior normal.         Thought Content: Thought content normal.         Cognition and Memory: Cognition normal.         Judgment: Judgment normal.         Assessment / Plan:           Past Medical History:   Diagnosis Date    Arthritis     GERD (gastroesophageal reflux disease) in past    Hyperlipidemia     Knee gives out left    S/P colonoscopy 2009    nl - check 10 yrs     Past Surgical History:   Procedure Laterality Date    ANTERIOR CRUCIATE LIGAMENT REPAIR Right 1978    ACL repair    CERVICAL SPINE SURGERY  2020    Fusion-occiput    COLONOSCOPY  2007    normal    COLONOSCOPY N/A 11/05/2019    Diverticulosis. Recheck 10 years.    [address] REPLACEMENT  2003    right hip    KNEE ARTHROSCOPY Left 09/04/2015    LEFT KNEE ARTHROSCOPY CHONDROPLASTY SYNOVECTOMY PARTIAL     Current Outpatient Medications   Medication Sig Dispense Refill    acetaminophen (TYLENOL) 325 MG tablet Take 650 mg by mouth every 6 hours as needed for Pain      sildenafil (REVATIO) 20 MG tablet TAKE 2-5 TABLETS BY MOUTH AS NEEDED 90 tablet 2    simvastatin (ZOCOR) 80 MG tablet TAKE 1 TABLET BY MOUTH IN THE EVENING 90 tablet 3    meloxicam (MOBIC) 15 MG tablet Take 15 mg by mouth daily      ibuprofen (ADVIL;MOTRIN) 200 MG tablet Take 200 mg by mouth every 6 hours as needed for Pain      omeprazole (PRILOSEC) 20 MG delayed release capsule Take 20 mg by mouth daily       No current facility-administered medications for this visit. No Known Allergies  The patient has a family history of  shoulder  Assessment:   Patient here today for preoperative evaluation. See Assessment for comments on acute and chronic medical problems and clearance evaluation. Encounter Diagnoses   Name Primary?  Pre-op exam Yes    Preop examination     Cervical spine disease     Pharyngeal dysphagia     Hyperlipidemia, unspecified hyperlipidemia type        Preop examination  Schedule left knee total arthroplasty - OK FOR SURGERY. OK for planned procedure. Take medications as directed. Cervical spine disease  Status post cervical spine fusion and fixation    Pharyngeal dysphagia  Only with certain solids-since cervical spine surgery. Refer to ENT for evaluation. Hyperlipidemia  Check labs. Zocor. EKG normal.        Plan:      Orders Placed This Encounter   Procedures    EKG 12 Lead     Order Specific Question:   Reason for Exam?     Answer: Other              OK FOR SURGERY/planned procedure. No history of anesthesia complications or reactions in patient or family. SH - noncontributory. No current signs of illness or COVID infection.   Smoking history -non-smoker  ETOH history -social

## 2021-03-10 LAB
A/G RATIO: 1.8 (ref 1.1–2.2)
ALBUMIN SERPL-MCNC: 5 G/DL (ref 3.4–5)
ALP BLD-CCNC: 99 U/L (ref 40–129)
ALT SERPL-CCNC: 27 U/L (ref 10–40)
ANION GAP SERPL CALCULATED.3IONS-SCNC: 15 MMOL/L (ref 3–16)
AST SERPL-CCNC: 29 U/L (ref 15–37)
BILIRUB SERPL-MCNC: 0.5 MG/DL (ref 0–1)
BUN BLDV-MCNC: 18 MG/DL (ref 7–20)
CALCIUM SERPL-MCNC: 9.8 MG/DL (ref 8.3–10.6)
CHLORIDE BLD-SCNC: 105 MMOL/L (ref 99–110)
CHOLESTEROL, TOTAL: 182 MG/DL (ref 0–199)
CO2: 23 MMOL/L (ref 21–32)
CREAT SERPL-MCNC: 0.8 MG/DL (ref 0.8–1.3)
GFR AFRICAN AMERICAN: >60
GFR NON-AFRICAN AMERICAN: >60
GLOBULIN: 2.8 G/DL
GLUCOSE BLD-MCNC: 119 MG/DL (ref 70–99)
HDLC SERPL-MCNC: 62 MG/DL (ref 40–60)
LDL CHOLESTEROL CALCULATED: 99 MG/DL
POTASSIUM SERPL-SCNC: 4.3 MMOL/L (ref 3.5–5.1)
PROSTATE SPECIFIC ANTIGEN: 0.91 NG/ML (ref 0–4)
SODIUM BLD-SCNC: 143 MMOL/L (ref 136–145)
TOTAL PROTEIN: 7.8 G/DL (ref 6.4–8.2)
TRIGL SERPL-MCNC: 104 MG/DL (ref 0–150)
TSH REFLEX FT4: 3.31 UIU/ML (ref 0.27–4.2)
VLDLC SERPL CALC-MCNC: 21 MG/DL

## 2021-03-11 DIAGNOSIS — E53.8 VITAMIN B12 DEFICIENCY: ICD-10-CM

## 2021-03-11 DIAGNOSIS — D64.9 ANEMIA, UNSPECIFIED TYPE: Primary | ICD-10-CM

## 2021-03-18 DIAGNOSIS — Z01.818 PREOP EXAMINATION: Primary | ICD-10-CM

## 2021-03-19 DIAGNOSIS — D64.9 ANEMIA, UNSPECIFIED TYPE: ICD-10-CM

## 2021-03-19 DIAGNOSIS — E53.8 VITAMIN B12 DEFICIENCY: ICD-10-CM

## 2021-03-19 DIAGNOSIS — Z01.818 PREOP EXAMINATION: ICD-10-CM

## 2021-03-19 LAB
BASOPHILS ABSOLUTE: 0 K/UL (ref 0–0.2)
BASOPHILS RELATIVE PERCENT: 0.8 %
EOSINOPHILS ABSOLUTE: 0.1 K/UL (ref 0–0.6)
EOSINOPHILS RELATIVE PERCENT: 2.1 %
FERRITIN: 511.2 NG/ML (ref 30–400)
FOLATE: 5.75 NG/ML (ref 4.78–24.2)
HCT VFR BLD CALC: 40.3 % (ref 40.5–52.5)
HEMOGLOBIN: 13.7 G/DL (ref 13.5–17.5)
INR BLD: 1.01 (ref 0.86–1.14)
IRON SATURATION: 58 % (ref 20–50)
IRON: 125 UG/DL (ref 59–158)
LYMPHOCYTES ABSOLUTE: 1.1 K/UL (ref 1–5.1)
LYMPHOCYTES RELATIVE PERCENT: 24.9 %
MCH RBC QN AUTO: 35.5 PG (ref 26–34)
MCHC RBC AUTO-ENTMCNC: 34 G/DL (ref 31–36)
MCV RBC AUTO: 104.3 FL (ref 80–100)
MONOCYTES ABSOLUTE: 0.5 K/UL (ref 0–1.3)
MONOCYTES RELATIVE PERCENT: 10.3 %
NEUTROPHILS ABSOLUTE: 2.8 K/UL (ref 1.7–7.7)
NEUTROPHILS RELATIVE PERCENT: 61.9 %
PDW BLD-RTO: 13.7 % (ref 12.4–15.4)
PLATELET # BLD: 232 K/UL (ref 135–450)
PMV BLD AUTO: 8.7 FL (ref 5–10.5)
PROTHROMBIN TIME: 11.7 SEC (ref 10–13.2)
RBC # BLD: 3.86 M/UL (ref 4.2–5.9)
TOTAL IRON BINDING CAPACITY: 216 UG/DL (ref 260–445)
VITAMIN B-12: 425 PG/ML (ref 211–911)
WBC # BLD: 4.6 K/UL (ref 4–11)

## 2021-03-20 LAB
ESTIMATED AVERAGE GLUCOSE: 99.7 MG/DL
HBA1C MFR BLD: 5.1 %

## 2021-03-23 LAB
BILIRUBIN URINE: NEGATIVE
BLOOD, URINE: NEGATIVE
CLARITY: CLEAR
COLOR: YELLOW
GLUCOSE URINE: NEGATIVE MG/DL
KETONES, URINE: NEGATIVE MG/DL
LEUKOCYTE ESTERASE, URINE: NEGATIVE
MICROSCOPIC EXAMINATION: NORMAL
NITRITE, URINE: NEGATIVE
PH UA: 6.5 (ref 5–8)
PROTEIN UA: NEGATIVE MG/DL
SPECIFIC GRAVITY UA: 1.02 (ref 1–1.03)
URINE TYPE: NORMAL
UROBILINOGEN, URINE: 1 E.U./DL

## 2021-04-08 PROBLEM — Z01.818 PREOP EXAMINATION: Status: RESOLVED | Noted: 2021-03-09 | Resolved: 2021-04-08

## 2021-04-23 ENCOUNTER — TELEPHONE (OUTPATIENT)
Dept: INTERNAL MEDICINE CLINIC | Age: 66
End: 2021-04-23

## 2021-04-23 DIAGNOSIS — R13.10 DYSPHAGIA, UNSPECIFIED TYPE: Primary | ICD-10-CM

## 2021-04-23 NOTE — TELEPHONE ENCOUNTER
Patient called stating he has an appointment on Monday, 04/26/21 with Dr. Sean Villalobos, ENT. They need a referral put in the system for his appointment. Please call with questions.

## 2021-04-26 ENCOUNTER — OFFICE VISIT (OUTPATIENT)
Dept: ENT CLINIC | Age: 66
End: 2021-04-26
Payer: COMMERCIAL

## 2021-04-26 VITALS
HEART RATE: 83 BPM | TEMPERATURE: 98.6 F | HEIGHT: 67 IN | SYSTOLIC BLOOD PRESSURE: 125 MMHG | DIASTOLIC BLOOD PRESSURE: 77 MMHG | WEIGHT: 183 LBS | BODY MASS INDEX: 28.72 KG/M2

## 2021-04-26 DIAGNOSIS — R13.10 DYSPHAGIA, UNSPECIFIED TYPE: Primary | ICD-10-CM

## 2021-04-26 PROCEDURE — 99243 OFF/OP CNSLTJ NEW/EST LOW 30: CPT | Performed by: OTOLARYNGOLOGY

## 2021-04-26 PROCEDURE — 31575 DIAGNOSTIC LARYNGOSCOPY: CPT | Performed by: OTOLARYNGOLOGY

## 2021-04-26 ASSESSMENT — ENCOUNTER SYMPTOMS
BACK PAIN: 0
COUGH: 0
EYE DISCHARGE: 0
BLOOD IN STOOL: 0
TROUBLE SWALLOWING: 1
CONSTIPATION: 0
CHOKING: 0
SINUS PRESSURE: 0
WHEEZING: 0
DIARRHEA: 0
EYE PAIN: 0
VOMITING: 0
SINUS PAIN: 0
NAUSEA: 0
RHINORRHEA: 0
CHEST TIGHTNESS: 0
VOICE CHANGE: 0
FACIAL SWELLING: 0
SORE THROAT: 0
STRIDOR: 0
COLOR CHANGE: 0
SHORTNESS OF BREATH: 0
APNEA: 0

## 2021-05-03 ENCOUNTER — OFFICE VISIT (OUTPATIENT)
Dept: INTERNAL MEDICINE CLINIC | Age: 66
End: 2021-05-03
Payer: COMMERCIAL

## 2021-05-03 VITALS
DIASTOLIC BLOOD PRESSURE: 68 MMHG | SYSTOLIC BLOOD PRESSURE: 128 MMHG | HEIGHT: 67 IN | WEIGHT: 181 LBS | BODY MASS INDEX: 28.41 KG/M2

## 2021-05-03 DIAGNOSIS — Z01.818 PREOP EXAMINATION: ICD-10-CM

## 2021-05-03 DIAGNOSIS — M48.9 CERVICAL SPINE DISEASE: ICD-10-CM

## 2021-05-03 DIAGNOSIS — K29.70 GASTRITIS WITHOUT BLEEDING, UNSPECIFIED CHRONICITY, UNSPECIFIED GASTRITIS TYPE: ICD-10-CM

## 2021-05-03 DIAGNOSIS — R13.13 PHARYNGEAL DYSPHAGIA: ICD-10-CM

## 2021-05-03 LAB
A/G RATIO: 2.1 (ref 1.1–2.2)
ALBUMIN SERPL-MCNC: 4.8 G/DL (ref 3.4–5)
ALP BLD-CCNC: 109 U/L (ref 40–129)
ALT SERPL-CCNC: 16 U/L (ref 10–40)
ANION GAP SERPL CALCULATED.3IONS-SCNC: 13 MMOL/L (ref 3–16)
APTT: 31.8 SEC (ref 24.2–36.2)
AST SERPL-CCNC: 25 U/L (ref 15–37)
BASOPHILS ABSOLUTE: 0 K/UL (ref 0–0.2)
BASOPHILS RELATIVE PERCENT: 0.6 %
BILIRUB SERPL-MCNC: 0.7 MG/DL (ref 0–1)
BILIRUBIN URINE: NEGATIVE
BLOOD, URINE: NEGATIVE
BUN BLDV-MCNC: 14 MG/DL (ref 7–20)
C-REACTIVE PROTEIN: <3 MG/L (ref 0–5.1)
CALCIUM SERPL-MCNC: 9.3 MG/DL (ref 8.3–10.6)
CHLORIDE BLD-SCNC: 103 MMOL/L (ref 99–110)
CLARITY: CLEAR
CO2: 24 MMOL/L (ref 21–32)
COLOR: YELLOW
CREAT SERPL-MCNC: 0.8 MG/DL (ref 0.8–1.3)
EOSINOPHILS ABSOLUTE: 0.1 K/UL (ref 0–0.6)
EOSINOPHILS RELATIVE PERCENT: 2.6 %
GFR AFRICAN AMERICAN: >60
GFR NON-AFRICAN AMERICAN: >60
GLOBULIN: 2.3 G/DL
GLUCOSE BLD-MCNC: 79 MG/DL (ref 70–99)
GLUCOSE URINE: NEGATIVE MG/DL
HCT VFR BLD CALC: 37.9 % (ref 40.5–52.5)
HEMOGLOBIN: 13.1 G/DL (ref 13.5–17.5)
INR BLD: 1.09 (ref 0.86–1.14)
KETONES, URINE: NEGATIVE MG/DL
LEUKOCYTE ESTERASE, URINE: NEGATIVE
LYMPHOCYTES ABSOLUTE: 1 K/UL (ref 1–5.1)
LYMPHOCYTES RELATIVE PERCENT: 22.1 %
MCH RBC QN AUTO: 37.2 PG (ref 26–34)
MCHC RBC AUTO-ENTMCNC: 34.6 G/DL (ref 31–36)
MCV RBC AUTO: 107.5 FL (ref 80–100)
MICROSCOPIC EXAMINATION: NORMAL
MONOCYTES ABSOLUTE: 0.4 K/UL (ref 0–1.3)
MONOCYTES RELATIVE PERCENT: 9.4 %
NEUTROPHILS ABSOLUTE: 2.9 K/UL (ref 1.7–7.7)
NEUTROPHILS RELATIVE PERCENT: 65.3 %
NITRITE, URINE: NEGATIVE
PDW BLD-RTO: 14.9 % (ref 12.4–15.4)
PH UA: 5.5 (ref 5–8)
PLATELET # BLD: 222 K/UL (ref 135–450)
PMV BLD AUTO: 8.2 FL (ref 5–10.5)
POTASSIUM SERPL-SCNC: 4.3 MMOL/L (ref 3.5–5.1)
PROTEIN UA: NEGATIVE MG/DL
PROTHROMBIN TIME: 12.7 SEC (ref 10–13.2)
RBC # BLD: 3.53 M/UL (ref 4.2–5.9)
SEDIMENTATION RATE, ERYTHROCYTE: 7 MM/HR (ref 0–20)
SODIUM BLD-SCNC: 140 MMOL/L (ref 136–145)
SPECIFIC GRAVITY UA: 1.02 (ref 1–1.03)
TOTAL PROTEIN: 7.1 G/DL (ref 6.4–8.2)
URINE REFLEX TO CULTURE: NORMAL
URINE TYPE: NORMAL
UROBILINOGEN, URINE: 0.2 E.U./DL
WBC # BLD: 4.4 K/UL (ref 4–11)

## 2021-05-03 PROCEDURE — 99242 OFF/OP CONSLTJ NEW/EST SF 20: CPT | Performed by: INTERNAL MEDICINE

## 2021-05-03 RX ORDER — OMEPRAZOLE 20 MG/1
20 CAPSULE, DELAYED RELEASE ORAL DAILY
Qty: 30 CAPSULE | Refills: 5 | Status: SHIPPED | OUTPATIENT
Start: 2021-05-03 | End: 2021-07-28 | Stop reason: SDUPTHER

## 2021-05-03 ASSESSMENT — ENCOUNTER SYMPTOMS
EYE DISCHARGE: 0
TROUBLE SWALLOWING: 1
RESPIRATORY NEGATIVE: 1

## 2021-05-03 ASSESSMENT — PATIENT HEALTH QUESTIONNAIRE - PHQ9
SUM OF ALL RESPONSES TO PHQ QUESTIONS 1-9: 0
2. FEELING DOWN, DEPRESSED OR HOPELESS: 0

## 2021-05-03 NOTE — ASSESSMENT & PLAN NOTE
scheduled for right total knee arthroplasty. Status post left knee. OK FOR SURGERY. OK for planned procedure. Take medications as directed. Recently received both Covid vaccinations.

## 2021-05-03 NOTE — PROGRESS NOTES
Subjective:      Patient ID: Kyung Leung is a 61 y.o. male. HPI Patient here today for preoperative evaluation. Patient scheduled for right total knee arthroplasty. Status post left total knee. Status post Covid vaccinations. See Assessment for comments on acute and chronic medical problems and clearance evaluation. Review of Systems   Constitutional: Negative for fever. HENT: Positive for trouble swallowing (Minimal symptoms-status post evaluation). Eyes: Negative for discharge. Respiratory: Negative. Cardiovascular: Negative. Gastrointestinal:        Occasional gastritis   Genitourinary: Negative. Musculoskeletal: Positive for arthralgias (Especially right knee). Negative for neck pain. Skin: Negative for rash. Allergic/Immunologic: Negative for immunocompromised state. Neurological: Negative. Hematological: Negative. Psychiatric/Behavioral: Negative. Objective:   Physical Exam  Constitutional:       General: He is not in acute distress. Appearance: Normal appearance. He is not ill-appearing, toxic-appearing or diaphoretic. Eyes:      General: No scleral icterus. Right eye: No discharge. Left eye: No discharge. Extraocular Movements: Extraocular movements intact. Neck:      Musculoskeletal: Decreased range of motion. No pain with movement. Vascular: No carotid bruit. Comments: Posterior cervical scar intact  Cardiovascular:      Rate and Rhythm: Normal rate and regular rhythm. Pulses: Normal pulses. Carotid pulses are 2+ on the right side and 2+ on the left side. Radial pulses are 2+ on the right side and 2+ on the left side. Posterior tibial pulses are 2+ on the right side and 2+ on the left side. Heart sounds: Normal heart sounds. No murmur. No gallop. Pulmonary:      Effort: Pulmonary effort is normal. No respiratory distress. Breath sounds: Normal breath sounds. No wheezing or rales. 09/04/2015    LEFT KNEE ARTHROSCOPY CHONDROPLASTY SYNOVECTOMY PARTIAL    TONSILLECTOMY      TOTAL KNEE ARTHROPLASTY Left 03/2021     Current Outpatient Medications   Medication Sig Dispense Refill    omeprazole (PRILOSEC) 20 MG delayed release capsule Take 1 capsule by mouth daily 30 capsule 5    acetaminophen (TYLENOL) 325 MG tablet Take 650 mg by mouth every 6 hours as needed for Pain      simvastatin (ZOCOR) 80 MG tablet TAKE 1 TABLET BY MOUTH IN THE EVENING 90 tablet 3    ibuprofen (ADVIL;MOTRIN) 200 MG tablet Take 200 mg by mouth every 6 hours as needed for Pain       No current facility-administered medications for this visit. No Known Allergies  The patient has a family history of  shoulder  Assessment:   Patient here today for preoperative evaluation. See Assessment for comments on acute and chronic medical problems and clearance evaluation. Encounter Diagnoses   Name Primary?  Preop examination     Cervical spine disease     Pharyngeal dysphagia     Gastritis without bleeding, unspecified chronicity, unspecified gastritis type        Preop examination    scheduled for right total knee arthroplasty. Status post left knee. OK FOR SURGERY. OK for planned procedure. Take medications as directed. Recently received both Covid vaccinations. Cervical spine disease    status post fusion. Doing well. Minimal dysphagia. Pharyngeal dysphagia    status post ENT evaluation. No obstruction. Patient to be evaluated by SLP. Gastritis    occasional symptoms-refill omeprazole. Plan:      No orders of the defined types were placed in this encounter. OK FOR SURGERY/planned procedure. No history of anesthesia complications or reactions in patient or family. SH - noncontributory. No current signs of illness or COVID infection.   Smoking history -non-smoker  ETOH history -social

## 2021-05-03 NOTE — LETTER
[facility] [address]  [address], [address] [place] [id number]  Phone: [phone]  Fax: [phone]    Amanda Dudley MD        May 3, 2021     Patient: Dharmesh Loving   YOB: 1955   Date of Visit: 5/3/2021       To Whom It May Concern: It is my medical opinion that Charmayne Modest requires a disability parking placard for the following reasons:  He cannot walk 200 feet without stopping to rest.  Duration of need: 5 years    If you have any questions or concerns, please don't hesitate to call.     Sincerely,        Amanda Dudley MD

## 2021-05-04 LAB
ESTIMATED AVERAGE GLUCOSE: 82.5 MG/DL
HBA1C MFR BLD: 4.5 %

## 2021-05-06 ENCOUNTER — TELEPHONE (OUTPATIENT)
Dept: INTERNAL MEDICINE CLINIC | Age: 66
End: 2021-05-06

## 2021-05-06 NOTE — TELEPHONE ENCOUNTER
Elizabeth from [facility] Pre-op would janina H [facility] P and labs faxed to [phone]. Faxed.

## 2021-06-02 PROBLEM — Z01.818 PREOP EXAMINATION: Status: RESOLVED | Noted: 2021-03-09 | Resolved: 2021-06-02

## 2021-06-07 ENCOUNTER — PROCEDURE VISIT (OUTPATIENT)
Dept: SPEECH THERAPY | Age: 66
End: 2021-06-07
Payer: COMMERCIAL

## 2021-06-07 DIAGNOSIS — R13.12 OROPHARYNGEAL DYSPHAGIA: ICD-10-CM

## 2021-06-07 PROCEDURE — 92526 ORAL FUNCTION THERAPY: CPT | Performed by: SPEECH-LANGUAGE PATHOLOGIST

## 2021-06-07 PROCEDURE — 92612 ENDOSCOPY SWALLOW (FEES) VID: CPT | Performed by: SPEECH-LANGUAGE PATHOLOGIST

## 2021-06-07 NOTE — PROGRESS NOTES
[facility] ENT  SPEECH THERAPY  Fiberoptic Endoscopic Evaluation of Swallowing  (FEES)/Treatment      Name: Rosamaria Baker  YOB: 1955  Gender: male  MRN: [id number]  Referring Physician: Dr. Roger Espinoza  Diagnosis: Dysphagia  Onset Date: 11/2/21  History of Present Illness/Injury:   Patient Active Problem List    Diagnosis Date Noted    Pharyngeal dysphagia 03/09/2021    Cervical spine disease 08/05/2020    Chronic left shoulder pain 02/18/2020    Action tremor 02/18/2020    Seborrheic keratoses 10/21/2016    Ocular migraine 09/15/2016    S/P left knee arthroscopy, chondroplasty, synovectomy, partial medial and lateral meniscectomy 9/4/2015 09/15/2015    ED (erectile dysfunction) 06/14/2010    Gastritis 06/10/2010    Hyperlipidemia 06/10/2010     Date of Exam: 6/7/21    Recent Chest X-ray (3/9/21)-  FINDINGS:       Again noted is the occipital C2 posterior fusion. Alignment is unchanged. The hardware is    unchanged with no hardware complications. The anterior listhesis at C2-3 and minimal    retrolisthesis at C4-5 is unchanged. Moderate to severe multilevel degenerative changes are    identified, worse at C4-5, C5-6 and C6-7 levels, overall stable. No movement between the    flexion and extension views. Soft tissues appear normal. No acute bony abnormality. Visualized    lungs appear normal.       IMPRESSION:       1.  Stable posterior fusion from occiput to C2. No hardware complications.       2.  Multilevel moderate to severe degenerative changes, unchanged. Previous SLP Evaluations-  NA    Patient Complaints/Reason for Referral:  Rosamaria Baker was referred for a FEES to assess the efficiency of his/her swallow function, assess for aspiration, and to make recommendations regarding safe dietary consistencies, effective compensatory strategies, and safe eating environment. Patient complaints: Solid food dysphagia    SUBJECTIVE:  Pt w/ C1-2 Laminectomy performed 11/2/21.  Onset of solid food dysphagia post-operatively; characterized as \"sticking\" sensation and pointed to hyoid bone. Occurs most w/ \"dry\" solids including meats and breads. Endorsed progressive improvement over the last few months however, continues to be bothersome and pt fearful of choking/aspiration. Typically uses liquid wash as compensation; wife is an SLP and suggested small sips/bites w/ external pacing. Denied dysphonia or dyspnea. IMPRESSIONS:   Pt presents w/ mild-moderate oropharyngeal dysphagia characterized by retroflexed epiglottic positioning at baseline, reduced BOT retraction, atrophy and decreased constriction of medial pharyngeal wall, and reduced UES opening secondary to edematous changes. Pt noted to have consistent, mild PPW residue w/ all consistencies that required multiple sequential swallows to clear; no deficits observed w/ lateral wall constriction. Moderate vallecular packing w/ regular solid consistency; cleared w/ liquid wash. Pt endorsed \"sticking\" sensation occurred during this time. Consistent UES residue w/ all consistencies secondary to hypertropic changes; cleared w/ secondary swallow. No penetration or aspiration w/ any trials. RECOMMENDATIONS:  Diet: [] NPO   [] NGT   [] PEG   [x] PO  Solid Consistency: Regular  Liquid Consistency: Thin  Compensatory Techniques: Liquid wash; secondary swallow  Positioning/Supervision: Upright; remain upright 30-45 minutes after meals  Referral to: [x] SLP (Dysphagia Tx)   [] ENT  [] GI    Pain:  Denied. No odynophagia.     ORAL MOTOR EXAMINATION:  Alert : [x] Yes  [] No    Functional Comm : [x] intact  [] impaired [] absent  Voice Quality : [x] normal  [] hoarse  [] wet  [] aphonic  [] breathy  [] strained    Tongue Range of Motion :  [x] intact  [] impaired [] absent    Tongue Strength :  [x] intact  [] impaired [] absent   Velopharyngeal competence : [x] intact  [] impaired [] absent   Gag : LEFT: [x] intact  [] impaired [] absent     RIGHT: [x] intact [] impaired [] absent  Volitional Cough : [x] intact  [] impaired [] absent   Volitional Swallow : [x] intact  [] impaired [] absent   Spontaneous Swallow : [x] intact  [] impaired [] absent   Laryngeal Elevation : [x] intact  [] impaired [] absent    ENDOSCOPIC EXAMINATION:  Vocal Fold adduction : [x] Complete  [] Incomplete   Interarytenoid/Post-com Edema : [x] Yes  [] No   Arytenoid Edema : [x] Yes  [] No   Arytenoid Erythema : [x] Yes  [] No   Vocal Fold Edema : [] Yes  [x] No   Pharyngeal Squeeze : LEFT: [x] intact  [] impaired [] absent      RIGHT:[x] intact  [] impaired [] absent   -Decreased medial wall constriction    SWALLOWING EVALUATION:  Testing position :[x] chair, upright  [] chair, 45 degrees  [] bed, upright      [] bed, 45 degrees [] fully upright  Baseline Pooling of Secretions : [] Yes  [x] No   Baseline Penetration of Secretions : [] Yes  [x] No   Baseline Aspiration of Secretions : [] Yes  [x] No  Backflow of Secretions : [x] Yes  [] No    CONSISTENCIES TRIALED :   Thin Puree MechSoft Solid   Spillage [] Yes [x] No [] Yes  [x] No [] Yes  [x] No [] Yes  [x] No     Laryngeal  Penetration  Able to clear? [] Yes [x] No    [] Yes [] No [] Yes  [x] No    [] Yes  [] No [] Yes  [x] No    [] Yes  [] No [] Yes  [x] No    [] Yes  [] No     Pharyngeal Residue  Able to clear? [x] Yes [] No  PPW; UES  [x] Yes [] No  Secondary swallow [x] Yes  [] No  PPW  [x] Yes  [] No  Secondary swallow [] Yes  [x] No    [] Yes  [] No [x] Yes  [] No  Valleculae; PPW; UES  [x] Yes  [] No  Liquid wash   Pharyngeal Pooling  Able to clear? [] Yes [x] No    [] Yes [] No [] Yes  [x] No    [] Yes  [] No [] Yes  [x] No    [] Yes  [] No [] Yes  [x] No    [] Yes  [] No   Aspiration    Response    Able to clear?  [] Yes [x] No    []Silent []Cough    [] Yes  [] No [] Yes  [x] No    []Silent []Cough    [] Yes  [] No [] Yes  [x] No    []Silent []Cough    [] Yes  [] No [] Yes  [x] No    []Silent []Cough    [] Yes  [] No       Bolus Backflow at UES  Able to clear? [x] Yes  [] No      [x] Yes  [] No [x] Yes  [] No      [x] Yes  [] No [] Yes  [x] No      [] Yes  [] No [x] Yes  [] No      [x] Yes  [] No        Adduction              Post-thin liquids     Post-puree              Post-soft solids     Post-regular solids             Post-liquid wash for regular solids    COMPENSATORY TECHNIQUES FOR INCREASED SAFETY:  Postural Changes : [] Yes  [x] No   Comments: Upright; medial head position    INCREASED RISK OF ASPIRATION DUE TO:  Poor Oral control and/or copious oral residue : [] Yes  [x] No   Redcued laryngopharyngeal sensation : [] Yes  [x] No  Premature spillage of bolus : [] Yes  [x] No  Inability to clear material from valleculae, pharynx, pyriform sinus or endolarynx : [x] Yes  [] No   Backflow to Pharynx : [x] Yes  [] No       Endoscope was removed without incident, no adverse reactions.     PENETRATION-ASPIRATION SCALE (PAS)  [] 8 Material enters the airway, passes below the vocal folds, and no effort is made to eject  [] 7 Material enters the airway, passes below the vocal folds, and is not ejected from the trachea despite effort  [] 6 Material enters the airway, passes below the vocal folds, and is ejected into the larynx or out of the airway  [] 5 Material enters the airway, contacts the vocal folds, and is not ejected into the airway  [] 4 Material enters the airway, contacts the vocal folds, and is ejected from the airway  [] 3 Material enters the airway, remains above the vocal folds, and is not ejected from airway  [] 2 Material enters the airway, remains above the vocal folds, and is ejected from airway  [x] 1 Material does not enter the airway    Dysphagia Treatment Goals  Goals Session #1   Patient will demonstrate comprehension of presence of dysphagia, risks associated with penetration/ aspiration, and need to adhere to management recommendations Reviewed FEES w/ pt, including impact of surgery on both anatomy and swallow physiology; expressed good understanding. Educated to retroflexed epiglottis, reduced BOT retraction, and medial PPW atrophy secondary to surgical changes. Discussed swallow physiology and 6-8wks for expected strengthening/improvement. Patient will demonstrate ability to utilize compensatory strategies  to reduce risk of penetration/aspiration with 90% accuracy, independently. slow rate   small bolus size   effortful/double swallow   solid/liquid alternation   upright posture    Pt independently using compensations at this time; encouraged to continue, specifically secondary swallow and liquid wash for clearance of oropharyngeal residue. Pt expressed understanding. Patient will complete exercises for improved hyolaryngeal elevation x 50 independently and accurately Effortful swallow x10  Expressed sensation of utilizing all musculature during completion     Patient will complete exercises for improved posterior pharyngeal wall movement x 25 reps independently and accurately Desi x5  Initial difficulties initiating swallow but able to complete w/o additional cues. Patient will complete exercises for improved base of tongue excursion x 25 reps independently and accurately \"k\" & \"g\" BOT pull-back x10  Expressed sensation of lingual fatigue during completion; encouraged to pull tongue back to PPW and use \"explosion\" of air for increased resistance. RECOMMENDED COMPENSATORY STRATEGIES / POSTURAL CHANGES:  See the above. EDUCATION:  Reviewed results and recommendations of this evaluation, signs, symptoms, and risk of aspiration, as well as diet recommendations and strategies. Reviewed and discussed goals and plan of care. TREATMENT TIME:  0825-0916  51 mins    Plan:  Continued daily Dysphagia treatment with goals per plan of care; bi-weekly for 6-8 wks.   Diet recommendations:  Regular + Thin  Prognosis: Good  Barriers to treatment: None    Thank you,    Maria Luz Dickerson) [place], [place], CCC-SLP; [id number]  Speech-Language Pathologist

## 2021-06-28 ENCOUNTER — PROCEDURE VISIT (OUTPATIENT)
Dept: SPEECH THERAPY | Age: 66
End: 2021-06-28
Payer: COMMERCIAL

## 2021-06-28 DIAGNOSIS — R13.12 OROPHARYNGEAL DYSPHAGIA: ICD-10-CM

## 2021-06-28 PROCEDURE — 92526 ORAL FUNCTION THERAPY: CPT | Performed by: SPEECH-LANGUAGE PATHOLOGIST

## 2021-06-28 NOTE — PROGRESS NOTES
[facility]) ENT  Dysphagia Therapy      Pt Seen for Therapy - Session # 2     Diagnosis/Indication:   Primary: Dysphagia  Secondary: C1-2 Laminectomy   Tertiary: Retroflexed epiglottis    Background History:   Pt w/ C1-2 Laminectomy performed 11/2/21. Onset of solid food dysphagia post-operatively; characterized as \"sticking\" sensation and pointed to hyoid bone. Occurs most w/ \"dry\" solids including meats and breads. Endorsed progressive improvement over the last few months however, continues to be bothersome and pt fearful of choking/aspiration. Typically uses liquid wash as compensation; wife is an SLP and suggested small sips/bites w/ external pacing. Denied dysphonia or dyspnea. Previous SLP Evaluations: 6/7/21  Pt presents w/ mild-moderate oropharyngeal dysphagia characterized by retroflexed epiglottic positioning at baseline, reduced BOT retraction, atrophy and decreased constriction of medial pharyngeal wall, and reduced UES opening secondary to edematous changes. Pt noted to have consistent, mild PPW residue w/ all consistencies that required multiple sequential swallows to clear; no deficits observed w/ lateral wall constriction. Moderate vallecular packing w/ regular solid consistency; cleared w/ liquid wash. Pt endorsed \"sticking\" sensation occurred during this time. Consistent UES residue w/ all consistencies secondary to hypertropic changes; cleared w/ secondary swallow. No penetration or aspiration w/ any trials. SUBJECTIVE:   Pt independently endorsed reduced compliance w/ completion of exercises however, after further discussion, has been completing daily. Pt notes positive improvement in symptoms, including decreased sensation of food sticking, specifically w/ meats. Ongoing difficulties w/ dry consistencies that break into pieces (ie nuts).      Weight:   Wt Readings from Last 3 Encounters:   05/03/21 181 lb (82.1 kg)   04/26/21 183 lb (83 kg)   03/09/21 189 lb (85.7 kg)     OBJECTIVE: completion of FEES. Transitioned into CTAR x10  Unable to complete Shaker secondary to cervical deficits; felt effort and \"pull\" at BOT/PPW during completion of technique and endorsed like of exercise. Patient will complete exercises for improved posterior pharyngeal wall movement x 25 reps independently and accurately Desi x5  Initial difficulties initiating swallow but able to complete w/o additional cues.    Desi x10  Able to complete 5 sequential exercises w/ increased effort and then required break/small sip of water. Encouraged that challenging the system is excellent and to continue to put effort into completion. Patient will complete exercises for improved base of tongue excursion x 25 reps independently and accurately \"k\" & \"g\" BOT pull-back x10  Expressed sensation of lingual fatigue during completion; encouraged to pull tongue back to PPW and use \"explosion\" of air for increased resistance.     \"k\" & \"g\" BOT pull-back x10  Noted to have excellent BOT retraction w/ complete obstruction of oropharynx via contact of BOT to PPW; released air/phonation and endorsed ongoing sensation of movement.         ASSESSMENT:      61 y.o. male w/ hx of C1-2 laminectomy in Nov; onset of dysphagia post-operatively. Pt endorsed improvement in sensation of dysphagia at this time, including decreased \"sticking\" and improve tolerance of harder consistencies (ie meats). Reviewed exercise physiology and 6-8/wks for strength building; motivated at improvement during last 3 weeks. Reviewed previously provided exercises, including Desi and \"k\" & \"g\" pull-back; able to complete w/ min-mod effort and encouraged to continue challenging system. Given intermittent completion of effortful swallow, transitioned into CTAR w/ use of effortful swallow for ongoing strength building and training of swallow physiology.  Pt expressed enjoyment w/ current exercises and in agreement w/ POC; will complete repeat FEES at next appt to reassess deficits and change rehabilitation plan as needed. Diet Recommendations:   Solid Consistency: Regular  Liquid Consistency: Thin    RECOMMENDATIONS:      1. Follow HEP and RTC for ongoing dysphagia tx  2.    RTC in 3 weeks    CPT Code Units Billed Time Billed Today Date of POC Start Re-Certification Date Referring Provider   20309 1 Unit Time in: 0825  Time out: 5625  Total time: 25 min 6/7/21 60 days Dr. Cookie Curry       Thank you,    Mary Sam) [facility]; [id number]  Voice Specialized Speech-Language Pathologist

## 2021-07-15 ENCOUNTER — TELEPHONE (OUTPATIENT)
Dept: INTERNAL MEDICINE CLINIC | Age: 66
End: 2021-07-15

## 2021-07-15 NOTE — TELEPHONE ENCOUNTER
RAY Sandhu [address] [place] [address]) called into office to request a letter ( add addendum) to say its okay for Dr. Parvin Velasquez to complete a right knee manipulation surgery for tomorrow ( 7/16). Pt is having surgery tomorrow at 8 am so the letter needs to be sent urgently. Dr. Josefa Strong completed the pre op history and physical on 5/13. Please fax with at the number below.      Fax- [phone]

## 2021-07-26 ENCOUNTER — PROCEDURE VISIT (OUTPATIENT)
Dept: SPEECH THERAPY | Age: 66
End: 2021-07-26
Payer: COMMERCIAL

## 2021-07-26 DIAGNOSIS — R13.12 OROPHARYNGEAL DYSPHAGIA: ICD-10-CM

## 2021-07-26 PROCEDURE — 92526 ORAL FUNCTION THERAPY: CPT | Performed by: SPEECH-LANGUAGE PATHOLOGIST

## 2021-07-26 NOTE — PROGRESS NOTES
[facility]) ENT  Dysphagia Therapy      Pt Seen for Therapy - Session # 3     Diagnosis/Indication:   Primary: Dysphagia  Secondary: C1-2 Laminectomy   Tertiary: Retroflexed epiglottis    Background History:   Pt w/ C1-2 Laminectomy performed 11/2/21. Onset of solid food dysphagia post-operatively; characterized as \"sticking\" sensation and pointed to hyoid bone. Occurs most w/ \"dry\" solids including meats and breads. Endorsed progressive improvement over the last few months however, continues to be bothersome and pt fearful of choking/aspiration. Typically uses liquid wash as compensation; wife is an SLP and suggested small sips/bites w/ external pacing. Denied dysphonia or dyspnea. Previous SLP Evaluations: 6/7/21  Pt presents w/ mild-moderate oropharyngeal dysphagia characterized by retroflexed epiglottic positioning at baseline, reduced BOT retraction, atrophy and decreased constriction of medial pharyngeal wall, and reduced UES opening secondary to edematous changes. Pt noted to have consistent, mild PPW residue w/ all consistencies that required multiple sequential swallows to clear; no deficits observed w/ lateral wall constriction. Moderate vallecular packing w/ regular solid consistency; cleared w/ liquid wash. Pt endorsed \"sticking\" sensation occurred during this time. Consistent UES residue w/ all consistencies secondary to hypertropic changes; cleared w/ secondary swallow. No penetration or aspiration w/ any trials. SUBJECTIVE:   Pt reported plateau of progress since last appointment however, continues to feel decreased sensation of food sticking. Greatest difficulties w/ food that breaks into pieces (ie meat, crackers, nuts). Asked appropriate questions t/o session and has good understanding of deficits.      Weight:   Wt Readings from Last 3 Encounters:   05/03/21 181 lb (82.1 kg)   04/26/21 183 lb (83 kg)   03/09/21 189 lb (85.7 kg)     OBJECTIVE:   Dysphagia Therapy     Goals Session #1 Session #2 Session #3   Patient will demonstrate comprehension of presence of dysphagia, risks associated with penetration/ aspiration, and need to adhere to management recommendations Reviewed FEES w/ pt, including impact of surgery on both anatomy and swallow physiology; expressed good understanding.      Educated to retroflexed epiglottis, reduced BOT retraction, and medial PPW atrophy secondary to surgical changes. Discussed swallow physiology and 6-8wks for expected strengthening/improvement.     Reviewed swallow physiology and current deficits related to reasoning behind exercises (ie specific targets). Discussed exercise physiology and building of strength over 6-8/wks. Pt encouraged w/ sensation of progress over the last 3 weeks and good understanding of dysphagia/dysphagia rehabilitation. Reviewed previous FEES, including specific location of residue and reasoning behind exercises to target area. Discussed anatomical differences, including retroflexed epiglottis and area of atrophy on PPW; per surgeon at recent appt, pt does have area of compression d/t cervical hardware, and suspect impacting efficiency of swallow. Reviewed repeat FEES after completion, including improvement in swallow physiology characterized by improved BOT retraction, reduced vallecular residue, and decreased PPW residue. Pt expressed understanding. Patient will demonstrate ability to utilize compensatory strategies  to reduce risk of penetration/aspiration with 90% accuracy, independently. slow rate              small bolus size              effortful/double swallow              solid/liquid alternation              upright posture    Pt independently using compensations at this time; encouraged to continue, specifically secondary swallow and liquid wash for clearance of oropharyngeal residue. Pt expressed understanding. Pt noted improvement during meals; decreased difficulties w/ meats.  Decreased fear during meals. Continue use of all compensations to assist.  Reviewed compensations, including alternation of liquid/solid to ensure adequate moisture of pharynx/larynx and assist w/ clearance of residue. Encouraged to utilize effortful swallow and sequential swallow to decrease sensation of food sticking. Patient will complete exercises for improved hyolaryngeal elevation x 50 independently and accurately Effortful swallow x10  Expressed sensation of utilizing all musculature during completion    Pt endorsed intermittent completion of exercises, but least desirable. Encouraged to continue completing t/o day w/ sips of water for ongoing strengthening of system. Will discuss implementation of MDTP boot camp at next session dependent on progress and after completion of FEES. Transitioned into CTAR x10  Unable to complete Shaker secondary to cervical deficits; felt effort and \"pull\" at BOT/PPW during completion of technique and endorsed like of exercise. Discussed completion of effortful swallow 50x daily w/ use of bolus (solid or liquid) for ongoing strengthening and maintenance of current swallow function. Discussed ongoing hypertrophy of UES, and reflux counseling provided given noted post-cricoid edema and interarytenoid pachydermia. Will begin taking Omeprazole daily w/ Gaviscon. Noted to have mild sinus irritation upon passing scope; provided daily sinus rinse to assist w/ clearance of all irritation. Patient will complete exercises for improved posterior pharyngeal wall movement x 25 reps independently and accurately Desi x5  Initial difficulties initiating swallow but able to complete w/o additional cues.    Desi x10  Able to complete 5 sequential exercises w/ increased effort and then required break/small sip of water. Encouraged that challenging the system is excellent and to continue to put effort into completion. Exercise discontinued at this time.     Patient will complete exercises for improved base of tongue excursion x 25 reps independently and accurately \"k\" & \"g\" BOT pull-back x10  Expressed sensation of lingual fatigue during completion; encouraged to pull tongue back to PPW and use \"explosion\" of air for increased resistance.     \"k\" & \"g  \" BOT pull-back x10  Noted to have excellent BOT retraction w/ complete obstruction of oropharynx via contact of BOT to PPW; released air/phonation and endorsed ongoing sensation of movement. \"k\" & \"g\" BOT pull-back x10  Will continue to complete d/t strengthening of BOT and ongoing maintenance of improvement. Pt endorsed easy to complete. Repeat FEES 7/26/21  Mild oropharyngeal dysphagia characterized by reduced PPW constriction (R>L) and hypertrophic changes of UES. Suspect atrophy of PPW d/t compression/placement of hardware however, improved clearance of PPW residue w/ all consistencies. Mild vallecular residue w/ regular solid x1 that cleared w/ secondary swallow; noted to have moderate collection of regular solid residue at UES x1 that cleared w/ use of liquid wash. Post-cricoid edema and interarytenoid pachydermia present, indicative of possible reflux contributing to UES opening. No episodes of penetration or aspiration w/ any consistencies. Mild irritation of the sinus passages w/ thick mucus. Post-thin liquids          Post-initial regular solid trial; no residue     Post-third regular solid trial; UES residue        Regular solid post-liquid wash    ASSESSMENT:      61 y.o. male w/ hx of C1-2 laminectomy in Nov; onset of dysphagia post-operatively. Pt endorsed ongoing improvement in symptoms, though feels may have hit plateau. Reviewed exercise physiology and ongoing adherence to recommendations. Repeat FEES performed to assess changes in swallow function; ongoing vallecular and UES residue but w/ regular solids only; improved BOT retraction for clearance of vallecular residue.  Ongoing UES residue noted; suspect impact of reflux on ROM. Overall, improving physiology. Encourage to take reflux medication daily and will trial Gaviscon. New POC established and pt expressed understanding to all the above. Prognosis is good w/ ongoing adherence to recommendations. Diet Recommendations:   Solid Consistency: Regular  Liquid Consistency: Thin    RECOMMENDATIONS:      1. Follow HEP and RTC for ongoing dysphagia tx  2.    RTC in 4 weeks    CPT Code Units Billed Time Billed Today Date of POC Start Re-Certification Date Referring Provider   53852 1 Unit Time in: 2779  Time out: 0920  Total time: 45 min 6/7/21 60 days Dr. Brock Dudley       Thank you,    Jarvis Trujillo) [address], [address] [place] [address]; [phone]  Voice Specialized Speech-Language Pathologist

## 2021-07-28 RX ORDER — OMEPRAZOLE 20 MG/1
20 CAPSULE, DELAYED RELEASE ORAL DAILY
Qty: 30 CAPSULE | Refills: 5 | Status: SHIPPED | OUTPATIENT
Start: 2021-07-28 | End: 2021-08-22 | Stop reason: SDUPTHER

## 2021-08-23 RX ORDER — OMEPRAZOLE 20 MG/1
20 CAPSULE, DELAYED RELEASE ORAL DAILY
Qty: 30 CAPSULE | Refills: 2 | Status: SHIPPED | OUTPATIENT
Start: 2021-08-23 | End: 2021-11-11 | Stop reason: SDUPTHER

## 2021-10-01 LAB
C-REACTIVE PROTEIN: <3 MG/L (ref 0–5.1)
SEDIMENTATION RATE, ERYTHROCYTE: 18 MM/HR (ref 0–20)

## 2021-11-11 RX ORDER — OMEPRAZOLE 20 MG/1
20 CAPSULE, DELAYED RELEASE ORAL DAILY
Qty: 30 CAPSULE | Refills: 2 | Status: SHIPPED | OUTPATIENT
Start: 2021-11-11 | End: 2022-01-04 | Stop reason: SDUPTHER

## 2022-01-04 RX ORDER — SIMVASTATIN 80 MG
TABLET ORAL
Qty: 30 TABLET | Refills: 0 | Status: SHIPPED | OUTPATIENT
Start: 2022-01-04 | End: 2022-02-07

## 2022-01-04 RX ORDER — OMEPRAZOLE 20 MG/1
20 CAPSULE, DELAYED RELEASE ORAL DAILY
Qty: 30 CAPSULE | Refills: 0 | Status: SHIPPED | OUTPATIENT
Start: 2022-01-04 | End: 2022-06-22 | Stop reason: SDUPTHER

## 2022-01-05 RX ORDER — SILDENAFIL CITRATE 20 MG/1
TABLET ORAL
Qty: 90 TABLET | Refills: 2 | Status: SHIPPED | OUTPATIENT
Start: 2022-01-05

## 2022-02-03 ENCOUNTER — TELEMEDICINE (OUTPATIENT)
Dept: INTERNAL MEDICINE CLINIC | Age: 67
End: 2022-02-03
Payer: COMMERCIAL

## 2022-02-03 DIAGNOSIS — D64.9 ANEMIA, UNSPECIFIED TYPE: ICD-10-CM

## 2022-02-03 DIAGNOSIS — E78.5 HYPERLIPIDEMIA, UNSPECIFIED HYPERLIPIDEMIA TYPE: ICD-10-CM

## 2022-02-03 DIAGNOSIS — F41.9 ANXIETY: ICD-10-CM

## 2022-02-03 DIAGNOSIS — M25.50 ARTHRALGIA, UNSPECIFIED JOINT: ICD-10-CM

## 2022-02-03 DIAGNOSIS — Z76.89 ENCOUNTER TO ESTABLISH CARE: Primary | ICD-10-CM

## 2022-02-03 DIAGNOSIS — Z11.59 SCREENING FOR VIRAL DISEASE: ICD-10-CM

## 2022-02-03 DIAGNOSIS — Z12.5 SCREENING FOR PROSTATE CANCER: ICD-10-CM

## 2022-02-03 PROCEDURE — 99204 OFFICE O/P NEW MOD 45 MIN: CPT | Performed by: INTERNAL MEDICINE

## 2022-02-03 RX ORDER — LORAZEPAM 1 MG/1
1 TABLET ORAL EVERY 8 HOURS PRN
Qty: 30 TABLET | Refills: 0 | Status: SHIPPED | OUTPATIENT
Start: 2022-02-03 | End: 2022-03-05

## 2022-02-03 RX ORDER — CYANOCOBALAMIN (VITAMIN B-12) 1000 MCG
1 TABLET, SUBLINGUAL SUBLINGUAL DAILY
COMMUNITY
Start: 2022-02-03 | End: 2022-04-27 | Stop reason: SDUPTHER

## 2022-02-03 RX ORDER — LORAZEPAM 1 MG/1
1 TABLET ORAL EVERY 8 HOURS PRN
Qty: 30 TABLET | Refills: 0 | Status: SHIPPED | OUTPATIENT
Start: 2022-02-03 | End: 2022-02-03 | Stop reason: SDUPTHER

## 2022-02-03 ASSESSMENT — ENCOUNTER SYMPTOMS
COUGH: 0
TROUBLE SWALLOWING: 0
RHINORRHEA: 0
NAUSEA: 0
SHORTNESS OF BREATH: 0
SORE THROAT: 0
ABDOMINAL PAIN: 0
DIARRHEA: 0

## 2022-02-03 NOTE — PROGRESS NOTES
Camille Johns (:  1955) is a 61 y.o. male, here for evaluation of the following chief complaint(s):    Establish Care      ASSESSMENT/PLAN:  1. Encounter to establish care  Chart extensively reviewed and updated. Preventive healthcare addressed. 2. Anxiety  -   Related to flying. Patient states prescription usually lasts a year. LORazepam (ATIVAN) 1 MG tablet; Take 1 tablet by mouth every 8 hours as needed for Anxiety for up to 30 days. May cause drowsiness. , Disp-30 tablet, R-0Normal  3. Hyperlipidemia, unspecified hyperlipidemia type  Check labs on simvastatin. Patient has a strong family history of heart disease.  -     Lipid Panel; Future  -     Comprehensive Metabolic Panel; Future  4. Screening for prostate cancer  -     PSA screening; Future  5. Screening for viral disease  -     HEPATITIS C ANTIBODY; Future  -     HIV-1 AND HIV-2 ANTIBODIES; Future  6. Arthralgia, unspecified joint  -     RHEUMATOID FACTOR; Future  7. Anemia, unspecified type  -     Ferritin; Future  -     Iron and TIBC; Future  -     Vitamin B12; Future  -     CBC; Future      Return in about 3 months (around 5/3/2022). AWV    SUBJECTIVE/OBJECTIVE:  HPI   New patient, here to establish care. Chart is reviewed and updated. His most significant medical issue is his history of of cervical spine stenosis with cord compression. He underwent surgery at [facility] in 2020 and is much improved. He is even able to ride his bicycle. He has a lot of arthritis which she attributes to old sports injuries. He is at the point now that he takes a rare Tylenol or Advil for discomfort. He does have some itchiness of his skin including his abdomen and scalp, which is worse in winter. He requests a refill of triamcinolone cream.  He states he has seen a dermatologist about this. He takes an occasional proton pump inhibitor but not daily.   He has not tried Pepcid and we discussed that perhaps he might try this instead. He states he has a fear of flying and requests a prescription for lorazepam.  He states that 30 of them typically last a year. He has a history of radial keratotomy to his eyes. He states that his vision is now getting worse and requests referral to an eye doctor. He has a prescription for sildenafil which he uses on occasion. PDMP Monitoring:    Last PDMP Louise Roldan as Reviewed [facility]):  Review User Review Instant Review Result   Shanika IBANEZ 2/3/2022  9:50 AM Reviewed PDMP [1]     Last Controlled Substance Monitoring Documentation      Telephone from 8/27/2015 in [facility] Prescription Monitoring Report for this patient was reviewed today. filed at 08/27/2015 0904        Urine Drug Screenings (1 yr)    No resulted procedures found. Medication Contract and Consent for Opioid Use Documents Filed     Patient Documents     Type of Document Status Date Received Received By Description    Medication Contract [Status Missing]                     Review of Systems   Constitutional: Negative for fatigue and fever. HENT: Negative for rhinorrhea, sore throat and trouble swallowing. Eyes: Negative for visual disturbance. Respiratory: Negative for cough and shortness of breath. Cardiovascular: Negative for chest pain and palpitations. Gastrointestinal: Negative for abdominal pain, diarrhea and nausea. Genitourinary: Negative for decreased urine volume, dysuria and frequency. Musculoskeletal: Positive for arthralgias and neck stiffness. Negative for myalgias. Skin: Negative for rash. Allergic/Immunologic: Negative for immunocompromised state. Neurological: Negative for dizziness, numbness and headaches. Hematological: Does not bruise/bleed easily. Psychiatric/Behavioral: Negative for dysphoric mood and sleep disturbance. The patient is nervous/anxious (w flying).         Past Medical History:   Diagnosis Date    Arthritis     see bone scan  Cervical radiculopathy     Chronic pruritus     Family history of chronic ischemic heart disease     GERD (gastroesophageal reflux disease) in past    Hyperlipidemia     Panic anxiety syndrome     with flying       Current Outpatient Medications   Medication Sig Dispense Refill    triamcinolone (KENALOG) 0.1 % ointment Apply topically 2 times daily for 7 days 80 g 0    LORazepam (ATIVAN) 1 MG tablet Take 1 tablet by mouth every 8 hours as needed for Anxiety for up to 30 days. May cause drowsiness. 30 tablet 0    Cyanocobalamin (VITAMIN B-12) 500 MCG SUBL Place 1 tablet under the tongue daily      Calcium Citrate-Vitamin D 200-250 MG-UNIT TABS Take 1 tablet by mouth 2 times daily      sildenafil (REVATIO) 20 MG tablet TAKE 2 TO 5 TABLETS BY MOUTH AS NEEDED 90 tablet 2    simvastatin (ZOCOR) 80 MG tablet TAKE 1 TABLET BY MOUTH IN THE EVENING 30 tablet 0    omeprazole (PRILOSEC) 20 MG delayed release capsule Take 1 capsule by mouth daily 30 capsule 0    acetaminophen (TYLENOL) 325 MG tablet Take 650 mg by mouth every 6 hours as needed for Pain      ibuprofen (ADVIL;MOTRIN) 200 MG tablet Take 200 mg by mouth every 6 hours as needed for Pain       No current facility-administered medications for this visit. Physical Exam    Constitutional: [x] Appears well-developed and well-nourished [x] No apparent distress        Mental status  [x] Alert and awake  [x] Oriented to person/place/time [x]Able to follow commands      Eyes:  EOM    [x]  Normal   Sclera  [x]  Normal     HENT:   [x] Normocephalic, atraumatic.     Mouth/Throat: Mucous membranes are moist.     External Ears [x] Normal      Neck: [x] No visualized mass     Pulmonary/Chest: [x] Respiratory effort normal.  [x] No visualized signs of difficulty breathing or respiratory distress           Musculoskeletal:   [x] Normal gait with no signs of ataxia         [x] Normal range of motion of neck          Neurological:        [x] No Facial Asymmetry (Cranial nerve 7 motor function) (limited exam to video visit)      Skin:        [x] No significant exanthematous lesions or discoloration noted on facial skin                  Psychiatric:       [x] Normal Affect         Other pertinent observable physical exam findings-       On this date I have spent 50 minutes reviewing previous notes, test results and face to face with the patient discussing the diagnosis and importance of compliance with the treatment plan as well as documenting on the day of the visit. Patient being evaluated by a Virtual Visit (video visit) encounter to address concerns as mentioned above. A caregiver was present when appropriate. Due to this being a TeleHealth encounter (During QQOBD-39 public health emergency), evaluation of the following organ systems was limited: Vitals/Constitutional/EENT/Resp/CV/GI//MS/Neuro/Skin/Heme-Lymph-Imm. Pursuant to the emergency declaration under the [address] [place] [address] authority and the [employer] and [employer] Act, this Virtual Visit was conducted with patient's (and/or legal guardian's) consent, to reduce the patient's risk of exposure to COVID-19 and provide necessary medical care. The patient (and/or legal guardian) has also been advised to contact this office for worsening conditions or problems, and seek emergency medical treatment and/or call 911 if deemed necessary. Patient identification was verified at the start of the visit: {YES    Services were provided through a video synchronous discussion virtually to substitute for in-person clinic visit. Patient was located at home and provider was located in office or at home. This note was generated completely or in part utilizing Dragon dictation speech recognition software. Occasionally, words are mistranscribed and despite editing, the text may contain inaccuracies due to incorrect word recognition.   If further clarification is needed please contact the office at [phone]     An electronic signature was used to authenticate this note.     --Estanislado Pallas, MD

## 2022-02-07 RX ORDER — SIMVASTATIN 80 MG
TABLET ORAL
Qty: 30 TABLET | Refills: 0 | Status: SHIPPED | OUTPATIENT
Start: 2022-02-07 | End: 2022-07-12 | Stop reason: SDUPTHER

## 2022-03-01 RX ORDER — SIMVASTATIN 80 MG
TABLET ORAL
Qty: 30 TABLET | Refills: 0 | OUTPATIENT
Start: 2022-03-01

## 2022-03-26 DIAGNOSIS — R79.89 ELEVATED FERRITIN: Primary | ICD-10-CM

## 2022-03-28 DIAGNOSIS — R79.89 ELEVATED FERRITIN: ICD-10-CM

## 2022-04-02 LAB
C282Y HEMOCHROMATOSIS MUT: NEGATIVE
H63D HEMOCHROMATOSIS MUT: NORMAL
HEMOCHROMATOSIS GENE ANALYSIS: NORMAL
HFE PCR SPECIMEN: NORMAL
S65C HEMOCHROMATOSIS MUT: NEGATIVE

## 2022-04-05 DIAGNOSIS — Z14.8 CARRIER OF HEMOCHROMATOSIS HFE GENE MUTATION: ICD-10-CM

## 2022-04-05 DIAGNOSIS — R71.8 ELEVATED MCV: Primary | ICD-10-CM

## 2022-04-27 RX ORDER — CYANOCOBALAMIN (VITAMIN B-12) 1000 MCG
1 TABLET, SUBLINGUAL SUBLINGUAL DAILY
Qty: 30 TABLET | Refills: 2 | Status: SHIPPED | OUTPATIENT
Start: 2022-04-27

## 2022-04-28 RX ORDER — OMEPRAZOLE 20 MG/1
20 CAPSULE, DELAYED RELEASE ORAL DAILY
Qty: 30 CAPSULE | Refills: 2 | OUTPATIENT
Start: 2022-04-28

## 2022-05-04 ENCOUNTER — OFFICE VISIT (OUTPATIENT)
Dept: INTERNAL MEDICINE CLINIC | Age: 67
End: 2022-05-04
Payer: COMMERCIAL

## 2022-05-04 VITALS
BODY MASS INDEX: 27.77 KG/M2 | HEART RATE: 78 BPM | HEIGHT: 68 IN | WEIGHT: 183.2 LBS | OXYGEN SATURATION: 98 % | SYSTOLIC BLOOD PRESSURE: 124 MMHG | DIASTOLIC BLOOD PRESSURE: 62 MMHG

## 2022-05-04 DIAGNOSIS — E78.5 HYPERLIPIDEMIA, UNSPECIFIED HYPERLIPIDEMIA TYPE: Primary | ICD-10-CM

## 2022-05-04 DIAGNOSIS — E55.9 VITAMIN D DEFICIENCY: ICD-10-CM

## 2022-05-04 DIAGNOSIS — Z82.49 FAMILY HISTORY OF CHRONIC ISCHEMIC HEART DISEASE: ICD-10-CM

## 2022-05-04 DIAGNOSIS — R71.8 ELEVATED MCV: ICD-10-CM

## 2022-05-04 DIAGNOSIS — K21.00 GASTROESOPHAGEAL REFLUX DISEASE WITH ESOPHAGITIS WITHOUT HEMORRHAGE: ICD-10-CM

## 2022-05-04 PROCEDURE — 90677 PCV20 VACCINE IM: CPT | Performed by: INTERNAL MEDICINE

## 2022-05-04 PROCEDURE — 1036F TOBACCO NON-USER: CPT | Performed by: INTERNAL MEDICINE

## 2022-05-04 PROCEDURE — 3017F COLORECTAL CA SCREEN DOC REV: CPT | Performed by: INTERNAL MEDICINE

## 2022-05-04 PROCEDURE — 1123F ACP DISCUSS/DSCN MKR DOCD: CPT | Performed by: INTERNAL MEDICINE

## 2022-05-04 PROCEDURE — 99214 OFFICE O/P EST MOD 30 MIN: CPT | Performed by: INTERNAL MEDICINE

## 2022-05-04 PROCEDURE — 4040F PNEUMOC VAC/ADMIN/RCVD: CPT | Performed by: INTERNAL MEDICINE

## 2022-05-04 PROCEDURE — G8417 CALC BMI ABV UP PARAM F/U: HCPCS | Performed by: INTERNAL MEDICINE

## 2022-05-04 PROCEDURE — G8427 DOCREV CUR MEDS BY ELIG CLIN: HCPCS | Performed by: INTERNAL MEDICINE

## 2022-05-04 PROCEDURE — G0009 ADMIN PNEUMOCOCCAL VACCINE: HCPCS | Performed by: INTERNAL MEDICINE

## 2022-05-04 SDOH — ECONOMIC STABILITY: FOOD INSECURITY: WITHIN THE PAST 12 MONTHS, THE FOOD YOU BOUGHT JUST DIDN'T LAST AND YOU DIDN'T HAVE MONEY TO GET MORE.: NEVER TRUE

## 2022-05-04 SDOH — ECONOMIC STABILITY: FOOD INSECURITY: WITHIN THE PAST 12 MONTHS, YOU WORRIED THAT YOUR FOOD WOULD RUN OUT BEFORE YOU GOT MONEY TO BUY MORE.: NEVER TRUE

## 2022-05-04 ASSESSMENT — PATIENT HEALTH QUESTIONNAIRE - PHQ9
SUM OF ALL RESPONSES TO PHQ QUESTIONS 1-9: 0
1. LITTLE INTEREST OR PLEASURE IN DOING THINGS: 0
2. FEELING DOWN, DEPRESSED OR HOPELESS: 0
SUM OF ALL RESPONSES TO PHQ QUESTIONS 1-9: 0
SUM OF ALL RESPONSES TO PHQ9 QUESTIONS 1 & 2: 0

## 2022-05-04 ASSESSMENT — SOCIAL DETERMINANTS OF HEALTH (SDOH): HOW HARD IS IT FOR YOU TO PAY FOR THE VERY BASICS LIKE FOOD, HOUSING, MEDICAL CARE, AND HEATING?: NOT HARD AT ALL

## 2022-05-04 NOTE — PATIENT INSTRUCTIONS
Prevnar 20     No appt needed - Labs soon    Covid vaccine soon (approx early June)    Check to see if Prior authorized at [facility]  Then consider proscan for cardiac calcium score $99  Question is whether to restart aspirin    --    a. [facility] [address] (Psychotherapy or Psychiatry)  o  [facility] (formerly [facility])  i. Accepts most insurances  ii. Many locations  iii. [phone]  iv. [email or web address]  v. Recommend scheduling online because telephone can have long wait times  o   b. Private Insurance/Self-pay                [facility]  i. Orthodoxy Psychologist  ii. Private insurance and medicare  iii. [address] [facility], [address] [facility]  iv. [phone]  v. [email or web address]    o [facility]  i. Two locations  ii. [phone]  iii. [email or web address].Invoke [email or web address] com    o Adult Child Family Counseling of [address] [facility] [address] [facility], [address], [facility]  ii. [phone]  iii. [email or web address]    o [facility] [place]       i. [employer]       ii. [phone]  iii. [address] [facility], [address] [facility]  iv. 100-155 per session  v. Individual, couples, and group counseling  vi. Do not bill insurance, but can provide you with bills that you can submit to your insurance to try to obtain reimbursement     o [facility] and [address] and [place] [employer]  ii. [address] [facility], [address] [place] [address] [address]  iii. [phone]  iv. [email or web address]  v. [email or web address]    o [facility]  i. Mainly self-pay, but will at times offer services for those with financial limitations  ii. [address], GOPAL [place]/ [address], [address]  iii. [address] [place], [place]   iv. [phone]  v. [email or web address]  vi. [email or web address]. [email or web address]    o [facility]   i.  Appears to be mainly self-pay, but may call to see if your insurance is accepted  ii. [address], [address] [place], [place], [place]  iii. [phone]  iv. [email or web address]    o The [address] [place] [address]. They do not bill insurance, but will provide you with a receipt for you to try to get reimbursed  ii. Considered out-of-network providers  iii. [address], [place], [address] [facility]  iv. Phone: [phone]  v. [email or web address]/    o [facility]  i. Accept many private insurance plans  ii. [address], [place], and [facility]  iii. [phone]  iv. [email or web address]    o [employer]  i. Accepts many insurances and also offers self-pay discounts  ii. Many different locations across [place] and [place]  iii. [phone]  iv. [email or web address]    o [address] [facility]  i. Insurance/payment not mentioned on website  ii. [facility] [address], [place], [place], [address]  iii. [phone]  iv. Diamond Mind    o [facility]  i. Insurance/payment not mentioned on website  ii. [address] [facility] [place]  iii. [phone]  iv. [employer]    o [facility]  i. Accepts many insurances  ii. [address] [facility], [place] [address]  iii. [phone]  iv. [email or web address]    o [facility]   i. Will provide you with a bill that you can submit to insurance to try to get reimbursed  ii. Likely will be out-of-network provider  iii. [address], [place], [address]  iv. [email or web address]    o [facility]  i. Appears to have a Florent[place]outi affiliation  ii. [address], [place], [address] [facility]  iii. [facility] location as well  iv. [phone]  v. [facility]. [address] [facility] Accepts many private insurances and Medicare  ii. [address] [place] [address]  iii. [phone] ext. [phone] [place], [place] [address]guillermina [facility]  i. Accepts many private insurances  ii.  Bianca Hawk [address] [facility], [address] [place] [address]  iii. [address] [facility] [address] Accepts Medicare and other private insurances  ii. [address], [place], [address] [place] [address]  iii. [phone]  iv. [email or web address]    o Michael Meehan  i. Accepts medicare and many private insurances  ii. [address] [facility], [address] [place] [address], [address] [place] [address]  iii. [phone]    o [email or web address]. com/us/[email or web address] Specialty Hospital - Winston-Salembrian  i.  Can utilize this website and filter by location and insurance  --      Dermatology  [phone]  Dr. Miriam Perales  ---  Counts include 234 beds at the [facility]  [phone]  Dr. Preston Avilez

## 2022-05-04 NOTE — PROGRESS NOTES
Fabiano Moreira (:  1955) is a 74 y.o. male, here for evaluation of the following chief complaint(s): Annual Exam and Results      ASSESSMENT/PLAN:  1. Hyperlipidemia, unspecified hyperlipidemia type  Well-controlled on simvastatin 80 mg  2. Elevated MCV  We will try to obtain notes from Dr. Brenda Miranda. Patient's B12 level was normal.  He does drink 1-2 alcoholic drinks per day. He also saw Dr Brenda Miranda regarding elevated ferritin which led to a hemochromatosis studies showing patient was heterozygous. We will try to get her office note. -     Folate; Future  3. Gastroesophageal reflux disease with esophagitis without hemorrhage  Stable on omeprazole which he takes maybe every other day  -     Magnesium; Future  4. Vitamin D deficiency  -     Vitamin D 25 Hydroxy; Future  5. 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 He does try to keep a busy social calendar with friends. He does have some frustration regarding the lack of a sexual relationship with his wife and may want to speak to a therapist about it. I told him I would send him some names later. --  Anxiety  -   Related to flying. Patient states prescription of Lorazepam usually lasts a year. Return in about 9 months (around 2023) for awv. SUBJECTIVE/OBJECTIVE:  HPI   Patient was supposed to be here for Medicare wellness visit but when he arrived today, this did not show up as \"due\" in his chart. Later in the day, it appears to be here. He proceeded to do her routine medical visit. He remains on his statin which he tolerates and which is effective. We discussed his strong family history of heart disease. Patient used to take an aspirin but not currently. We discussed doing a cardiac calcium score to determine if adding back an aspirin or seeing a cardiology might make sense.     He continues to take omeprazole but not daily for well-controlled heartburn symptoms. He did see Dr. Bert Chaney about heterozygous gene finding for hemochromatosis. Those notes are not available to me yet but I will try to get them. His cervical spine is his biggest concern. He has a lot of stiffness and limitation of motion since his surgery. His pain is much improved. Patient reports some dysthymia since his alf with less to do and more time around the house. Also he is somewhat frustrated that there is no longer sexual intimacy with his wife. He also requests names of ophthalmologists because his RK surgery has failed and his vision has declined. He gets much worse at night. Past Medical History:   Diagnosis Date    Arthritis     see bone scan    Cervical radiculopathy     Chronic pruritus     Family history of chronic ischemic heart disease     GERD (gastroesophageal reflux disease) in past    Hyperlipidemia     Panic anxiety syndrome     with flying       Current Outpatient Medications   Medication Sig Dispense Refill    Cyanocobalamin (VITAMIN B-12) 500 MCG SUBL Place 1 tablet under the tongue daily 30 tablet 2    Calcium Citrate-Vitamin D 200-250 MG-UNIT TABS Take 1 tablet by mouth 2 times daily 60 tablet 2    simvastatin (ZOCOR) 80 MG tablet TAKE 1 TABLET EVERY EVENING 30 tablet 0    sildenafil (REVATIO) 20 MG tablet TAKE 2 TO 5 TABLETS BY MOUTH AS NEEDED 90 tablet 2    omeprazole (PRILOSEC) 20 MG delayed release capsule Take 1 capsule by mouth daily 30 capsule 0     No current facility-administered medications for this visit. Physical Exam  Vitals and nursing note reviewed. Constitutional:       General: He is not in acute distress. Appearance: He is well-developed. HENT:      Head: Normocephalic and atraumatic. Right Ear: External ear normal.      Left Ear: External ear normal.   Eyes:      General: No scleral icterus.      Extraocular Movements: Extraocular movements intact. Neck:      Thyroid: No thyromegaly. Cardiovascular:      Rate and Rhythm: Normal rate and regular rhythm. Heart sounds: No murmur heard. Pulmonary:      Effort: No respiratory distress. Breath sounds: Normal breath sounds. No wheezing or rales. Abdominal:      General: Bowel sounds are normal. There is no distension. Palpations: Abdomen is soft. Tenderness: There is no abdominal tenderness. Musculoskeletal:         General: No deformity. Normal range of motion. Cervical back: Normal range of motion. Lymphadenopathy:      Cervical: No cervical adenopathy. Skin:     General: Skin is warm and dry. Neurological:      Mental Status: He is alert and oriented to person, place, and time. Cranial Nerves: No cranial nerve deficit. Sensory: No sensory deficit. Coordination: Coordination normal.   Psychiatric:         Thought Content: Thought content normal.               This note was generated completely or in part utilizing Dragon dictation speech recognition software. Occasionally, words are mistranscribed and despite editing, the text may contain inaccuracies due to incorrect word recognition. If further clarification is needed please contact the office at [phone]          An electronic signature was used to authenticate this note.     --Boom Jc MD

## 2022-05-16 ENCOUNTER — HOSPITAL ENCOUNTER (OUTPATIENT)
Dept: MRI IMAGING | Age: 67
Discharge: HOME | End: 2022-05-16
Payer: COMMERCIAL

## 2022-05-16 DIAGNOSIS — Z14.8 GENETIC CARRIER OF OTHER DISEASE: ICD-10-CM

## 2022-05-16 DIAGNOSIS — E83.110 HEREDITARY HEMOCHROMATOSIS (HCC): ICD-10-CM

## 2022-05-16 PROCEDURE — A9579 GAD-BASE MR CONTRAST NOS,1ML: HCPCS | Performed by: STUDENT IN AN ORGANIZED HEALTH CARE EDUCATION/TRAINING PROGRAM

## 2022-05-16 PROCEDURE — 6360000004 HC RX CONTRAST MEDICATION: Performed by: STUDENT IN AN ORGANIZED HEALTH CARE EDUCATION/TRAINING PROGRAM

## 2022-05-16 PROCEDURE — 74183 MRI ABD W/O CNTR FLWD CNTR: CPT

## 2022-05-16 RX ADMIN — GADOTERIDOL 16 ML: 279.3 INJECTION, SOLUTION INTRAVENOUS at 11:34

## 2022-06-20 ENCOUNTER — HOSPITAL ENCOUNTER (OUTPATIENT)
Dept: CT IMAGING | Age: 67
Discharge: HOME | End: 2022-06-20

## 2022-06-20 DIAGNOSIS — Z82.49 FAMILY HISTORY OF CHRONIC ISCHEMIC HEART DISEASE: ICD-10-CM

## 2022-06-20 PROCEDURE — 75571 CT HRT W/O DYE W/CA TEST: CPT

## 2022-06-22 RX ORDER — OMEPRAZOLE 20 MG/1
20 CAPSULE, DELAYED RELEASE ORAL DAILY
Qty: 30 CAPSULE | Refills: 5 | Status: SHIPPED | OUTPATIENT
Start: 2022-06-22 | End: 2022-07-21 | Stop reason: SDUPTHER

## 2022-06-22 NOTE — TELEPHONE ENCOUNTER
Patient is needing a re-fill on Omeprazole sent to [place] on [facility]. Looks like previously filled by Dr. Khadar Beyer and sent to [facility] but patient has new/different insurance now so needs sent to [facility] in [facility]. Please call him at number provided once this has been sent.

## 2022-06-24 RX ORDER — OMEPRAZOLE 20 MG/1
20 CAPSULE, DELAYED RELEASE ORAL DAILY
Qty: 30 CAPSULE | Refills: 0 | OUTPATIENT
Start: 2022-06-24

## 2022-07-11 RX ORDER — SIMVASTATIN 80 MG
TABLET ORAL
Qty: 30 TABLET | Refills: 0 | OUTPATIENT
Start: 2022-07-11

## 2022-07-11 NOTE — TELEPHONE ENCOUNTER
Please clarify this request for Simvastatin 80. Is 90 day rx needed or 30 day? Mail order or local pharmacy?

## 2022-07-12 RX ORDER — SIMVASTATIN 80 MG
TABLET ORAL
Qty: 30 TABLET | Refills: 0 | Status: SHIPPED | OUTPATIENT
Start: 2022-07-12 | End: 2022-07-21 | Stop reason: SDUPTHER

## 2022-07-12 NOTE — TELEPHONE ENCOUNTER
The request is for Mail Order.  I have updated that information and pended correct disp amount and entered correct pharmacy  Thank you

## 2022-08-01 RX ORDER — OMEPRAZOLE 20 MG/1
20 CAPSULE, DELAYED RELEASE ORAL DAILY
Qty: 90 CAPSULE | Refills: 3 | Status: SHIPPED | OUTPATIENT
Start: 2022-08-01

## 2022-08-01 NOTE — TELEPHONE ENCOUNTER
Last appointment: 5/4/2022  Next appointment: 2/6/2023  Last refill: needs to go to mail order pharmacy

## 2022-08-15 RX ORDER — SIMVASTATIN 80 MG
TABLET ORAL
Qty: 90 TABLET | Refills: 3 | Status: SHIPPED | OUTPATIENT
Start: 2022-08-15

## 2022-08-16 ENCOUNTER — E-VISIT (OUTPATIENT)
Dept: INTERNAL MEDICINE CLINIC | Age: 67
End: 2022-08-16
Payer: MEDICARE

## 2022-08-16 DIAGNOSIS — U07.1 COVID-19: Primary | ICD-10-CM

## 2022-08-16 PROCEDURE — 99422 OL DIG E/M SVC 11-20 MIN: CPT | Performed by: INTERNAL MEDICINE

## 2022-08-16 ASSESSMENT — LIFESTYLE VARIABLES: SMOKING_STATUS: NO, I HAVE NEVER SMOKED

## 2023-03-01 ENCOUNTER — TELEPHONE (OUTPATIENT)
Dept: INTERNAL MEDICINE CLINIC | Age: 68
End: 2023-03-01

## 2023-03-01 RX ORDER — ROSUVASTATIN CALCIUM 10 MG/1
10 TABLET, COATED ORAL NIGHTLY
Qty: 30 TABLET | Refills: 3 | Status: SHIPPED | OUTPATIENT
Start: 2023-03-01

## 2023-03-01 NOTE — TELEPHONE ENCOUNTER
Last appointment: 8/16/2022  Next appointment: 5/10/2023  Last refill: 8/15/2022    Received a fax stating that an alternate medication for the ZOCOR 80mg-     Atorvastatin  Pravastatin  Rosuvastatin will be covered

## 2023-03-01 NOTE — TELEPHONE ENCOUNTER
Let pt know I am changing his Rx from Zocor to Crestor, apparently as his insurance / pharmacy are requesting.

## 2023-03-01 NOTE — TELEPHONE ENCOUNTER
Patient is returning our call. He is wanting a call back regarding the difference between the Zocor 80mg and the Crestor 10mg. Please contact patient to advise.

## 2023-04-21 ENCOUNTER — PATIENT MESSAGE (OUTPATIENT)
Dept: INTERNAL MEDICINE CLINIC | Age: 68
End: 2023-04-21

## 2023-04-21 RX ORDER — ROSUVASTATIN CALCIUM 10 MG/1
10 TABLET, COATED ORAL NIGHTLY
Qty: 90 TABLET | Refills: 1 | Status: SHIPPED | OUTPATIENT
Start: 2023-04-21

## 2023-04-21 NOTE — TELEPHONE ENCOUNTER
From: Xenia Green  To: Dr. Kalyani Mathew: 4/21/2023 8:50 AM EDT  Subject: 80mg Simvistatin    I am trying to re-order my prescription through [facility] which is a mail order 90-day supply. This was disrupted last month because of a supply issue. As a result, a 30-day Rx was ordered for an alternative. I would like to continue my previous prescription of 80mg which was convenient and effective. [employer] sent a request to continue the 90-day supply of the 80mg at my request and states that it was rejected by you. Is there a problem? Please help as I am out of medication. Thank you.   Ashwin Bhatt

## 2023-05-08 SDOH — HEALTH STABILITY: PHYSICAL HEALTH: ON AVERAGE, HOW MANY MINUTES DO YOU ENGAGE IN EXERCISE AT THIS LEVEL?: 20 MIN

## 2023-05-08 SDOH — ECONOMIC STABILITY: HOUSING INSECURITY
IN THE LAST 12 MONTHS, WAS THERE A TIME WHEN YOU DID NOT HAVE A STEADY PLACE TO SLEEP OR SLEPT IN A SHELTER (INCLUDING NOW)?: NO

## 2023-05-08 SDOH — ECONOMIC STABILITY: INCOME INSECURITY: HOW HARD IS IT FOR YOU TO PAY FOR THE VERY BASICS LIKE FOOD, HOUSING, MEDICAL CARE, AND HEATING?: NOT HARD AT ALL

## 2023-05-08 SDOH — ECONOMIC STABILITY: FOOD INSECURITY: WITHIN THE PAST 12 MONTHS, YOU WORRIED THAT YOUR FOOD WOULD RUN OUT BEFORE YOU GOT MONEY TO BUY MORE.: NEVER TRUE

## 2023-05-08 SDOH — ECONOMIC STABILITY: FOOD INSECURITY: WITHIN THE PAST 12 MONTHS, THE FOOD YOU BOUGHT JUST DIDN'T LAST AND YOU DIDN'T HAVE MONEY TO GET MORE.: NEVER TRUE

## 2023-05-08 SDOH — HEALTH STABILITY: PHYSICAL HEALTH: ON AVERAGE, HOW MANY DAYS PER WEEK DO YOU ENGAGE IN MODERATE TO STRENUOUS EXERCISE (LIKE A BRISK WALK)?: 5 DAYS

## 2023-05-08 ASSESSMENT — LIFESTYLE VARIABLES
HOW OFTEN DURING THE LAST YEAR HAVE YOU HAD A FEELING OF GUILT OR REMORSE AFTER DRINKING: NEVER
HOW OFTEN DURING THE LAST YEAR HAVE YOU FOUND THAT YOU WERE NOT ABLE TO STOP DRINKING ONCE YOU HAD STARTED: 0
HAS A RELATIVE, FRIEND, DOCTOR, OR ANOTHER HEALTH PROFESSIONAL EXPRESSED CONCERN ABOUT YOUR DRINKING OR SUGGESTED YOU CUT DOWN: NO
HAVE YOU OR SOMEONE ELSE BEEN INJURED AS A RESULT OF YOUR DRINKING: NO
HOW OFTEN DO YOU HAVE SIX OR MORE DRINKS ON ONE OCCASION: 2
HOW MANY STANDARD DRINKS CONTAINING ALCOHOL DO YOU HAVE ON A TYPICAL DAY: 1 OR 2
HOW OFTEN DURING THE LAST YEAR HAVE YOU FAILED TO DO WHAT WAS NORMALLY EXPECTED FROM YOU BECAUSE OF DRINKING: 0
HOW OFTEN DO YOU HAVE A DRINK CONTAINING ALCOHOL: 5
HOW OFTEN DO YOU HAVE A DRINK CONTAINING ALCOHOL: 4 OR MORE TIMES A WEEK
HOW OFTEN DURING THE LAST YEAR HAVE YOU HAD A FEELING OF GUILT OR REMORSE AFTER DRINKING: 0
HOW OFTEN DURING THE LAST YEAR HAVE YOU FAILED TO DO WHAT WAS NORMALLY EXPECTED FROM YOU BECAUSE OF DRINKING: NEVER
HOW OFTEN DURING THE LAST YEAR HAVE YOU BEEN UNABLE TO REMEMBER WHAT HAPPENED THE NIGHT BEFORE BECAUSE YOU HAD BEEN DRINKING: NEVER
HOW OFTEN DURING THE LAST YEAR HAVE YOU NEEDED AN ALCOHOLIC DRINK FIRST THING IN THE MORNING TO GET YOURSELF GOING AFTER A NIGHT OF HEAVY DRINKING: 0
HOW OFTEN DURING THE LAST YEAR HAVE YOU BEEN UNABLE TO REMEMBER WHAT HAPPENED THE NIGHT BEFORE BECAUSE YOU HAD BEEN DRINKING: 0
HAVE YOU OR SOMEONE ELSE BEEN INJURED AS A RESULT OF YOUR DRINKING: 0
HAS A RELATIVE, FRIEND, DOCTOR, OR ANOTHER HEALTH PROFESSIONAL EXPRESSED CONCERN ABOUT YOUR DRINKING OR SUGGESTED YOU CUT DOWN: 0
HOW OFTEN DURING THE LAST YEAR HAVE YOU FOUND THAT YOU WERE NOT ABLE TO STOP DRINKING ONCE YOU HAD STARTED: NEVER
HOW OFTEN DURING THE LAST YEAR HAVE YOU NEEDED AN ALCOHOLIC DRINK FIRST THING IN THE MORNING TO GET YOURSELF GOING AFTER A NIGHT OF HEAVY DRINKING: NEVER
HOW MANY STANDARD DRINKS CONTAINING ALCOHOL DO YOU HAVE ON A TYPICAL DAY: 1

## 2023-05-08 ASSESSMENT — PATIENT HEALTH QUESTIONNAIRE - PHQ9
2. FEELING DOWN, DEPRESSED OR HOPELESS: 0
SUM OF ALL RESPONSES TO PHQ9 QUESTIONS 1 & 2: 0
1. LITTLE INTEREST OR PLEASURE IN DOING THINGS: 0
SUM OF ALL RESPONSES TO PHQ QUESTIONS 1-9: 0

## 2023-05-10 ENCOUNTER — OFFICE VISIT (OUTPATIENT)
Dept: INTERNAL MEDICINE CLINIC | Age: 68
End: 2023-05-10
Payer: MEDICARE

## 2023-05-10 VITALS
BODY MASS INDEX: 31.18 KG/M2 | DIASTOLIC BLOOD PRESSURE: 68 MMHG | WEIGHT: 194 LBS | OXYGEN SATURATION: 97 % | HEIGHT: 66 IN | HEART RATE: 84 BPM | SYSTOLIC BLOOD PRESSURE: 140 MMHG

## 2023-05-10 DIAGNOSIS — Z12.5 SCREENING PSA (PROSTATE SPECIFIC ANTIGEN): ICD-10-CM

## 2023-05-10 DIAGNOSIS — E78.5 HYPERLIPIDEMIA, UNSPECIFIED HYPERLIPIDEMIA TYPE: ICD-10-CM

## 2023-05-10 DIAGNOSIS — K21.00 GASTROESOPHAGEAL REFLUX DISEASE WITH ESOPHAGITIS WITHOUT HEMORRHAGE: ICD-10-CM

## 2023-05-10 DIAGNOSIS — E55.9 VITAMIN D DEFICIENCY: ICD-10-CM

## 2023-05-10 DIAGNOSIS — R03.0 ELEVATED BP WITHOUT DIAGNOSIS OF HYPERTENSION: ICD-10-CM

## 2023-05-10 DIAGNOSIS — Z00.00 INITIAL MEDICARE ANNUAL WELLNESS VISIT: Primary | ICD-10-CM

## 2023-05-10 LAB
25(OH)D3 SERPL-MCNC: 25.5 NG/ML
ALBUMIN SERPL-MCNC: 5 G/DL (ref 3.4–5)
ALBUMIN/GLOB SERPL: 1.7 {RATIO} (ref 1.1–2.2)
ALP SERPL-CCNC: 89 U/L (ref 40–129)
ALT SERPL-CCNC: 28 U/L (ref 10–40)
ANION GAP SERPL CALCULATED.3IONS-SCNC: 18 MMOL/L (ref 3–16)
AST SERPL-CCNC: 29 U/L (ref 15–37)
BILIRUB SERPL-MCNC: 0.7 MG/DL (ref 0–1)
BUN SERPL-MCNC: 21 MG/DL (ref 7–20)
CALCIUM SERPL-MCNC: 9.5 MG/DL (ref 8.3–10.6)
CHLORIDE SERPL-SCNC: 106 MMOL/L (ref 99–110)
CHOLEST SERPL-MCNC: 173 MG/DL (ref 0–199)
CO2 SERPL-SCNC: 17 MMOL/L (ref 21–32)
CREAT SERPL-MCNC: 0.7 MG/DL (ref 0.8–1.3)
GFR SERPLBLD CREATININE-BSD FMLA CKD-EPI: >60 ML/MIN/{1.73_M2}
GLUCOSE SERPL-MCNC: 100 MG/DL (ref 70–99)
HDLC SERPL-MCNC: 74 MG/DL (ref 40–60)
LDLC SERPL CALC-MCNC: 87 MG/DL
MAGNESIUM SERPL-MCNC: 2 MG/DL (ref 1.8–2.4)
POTASSIUM SERPL-SCNC: 4.5 MMOL/L (ref 3.5–5.1)
PROT SERPL-MCNC: 8 G/DL (ref 6.4–8.2)
PSA SERPL DL<=0.01 NG/ML-MCNC: 0.61 NG/ML (ref 0–4)
SODIUM SERPL-SCNC: 141 MMOL/L (ref 136–145)
TRIGL SERPL-MCNC: 60 MG/DL (ref 0–150)
VIT B12 SERPL-MCNC: 387 PG/ML (ref 211–911)
VLDLC SERPL CALC-MCNC: 12 MG/DL

## 2023-05-10 PROCEDURE — G0438 PPPS, INITIAL VISIT: HCPCS | Performed by: INTERNAL MEDICINE

## 2023-05-10 PROCEDURE — 99213 OFFICE O/P EST LOW 20 MIN: CPT | Performed by: INTERNAL MEDICINE

## 2023-05-10 PROCEDURE — G8417 CALC BMI ABV UP PARAM F/U: HCPCS | Performed by: INTERNAL MEDICINE

## 2023-05-10 PROCEDURE — G8427 DOCREV CUR MEDS BY ELIG CLIN: HCPCS | Performed by: INTERNAL MEDICINE

## 2023-05-10 PROCEDURE — 1036F TOBACCO NON-USER: CPT | Performed by: INTERNAL MEDICINE

## 2023-05-10 PROCEDURE — 1123F ACP DISCUSS/DSCN MKR DOCD: CPT | Performed by: INTERNAL MEDICINE

## 2023-05-10 PROCEDURE — 3017F COLORECTAL CA SCREEN DOC REV: CPT | Performed by: INTERNAL MEDICINE

## 2023-05-10 RX ORDER — TRIAMCINOLONE ACETONIDE 5 MG/G
CREAM TOPICAL
Qty: 454 G | Refills: 0 | Status: SHIPPED | OUTPATIENT
Start: 2023-05-10 | End: 2023-05-17

## 2023-05-10 RX ORDER — SILDENAFIL 100 MG/1
100 TABLET, FILM COATED ORAL DAILY PRN
Qty: 30 TABLET | Refills: 0 | Status: SHIPPED | OUTPATIENT
Start: 2023-05-10 | End: 2023-06-09

## 2023-05-10 NOTE — PATIENT INSTRUCTIONS
possible to meet your calcium requirement with diet alone, but a vitamin D supplement is usually necessary to meet this goal.  When exposed to the sun, use a sunscreen that protects against both UVA and UVB radiation with an SPF of 30 or greater. Reapply every 2 to 3 hours or after sweating, drying off with a towel, or swimming. Always wear a seat belt when traveling in a car. Always wear a helmet when riding a bicycle or motorcycle.   --  Labs  Work on 10 lbs weight loss and reduce salt to help blood pressure  Wear sunscreen  --  sunscreen  Dr. Bui-Dermatology  [facility]  Dr. Elaine Scott

## 2023-05-10 NOTE — PROGRESS NOTES
Medicare Annual Wellness Visit    Reynaldo Stuart is here for Medicare Narinder Alvarez   Initial Medicare annual wellness visit  Hyperlipidemia, unspecified hyperlipidemia type  Check labs on Crestor  -     Lipid Panel; Future  -     Comprehensive Metabolic Panel; Future  Gastroesophageal reflux disease with esophagitis without hemorrhage  Stable on Omeprazole which he tends to take daily. Encouraged him to cut back on alcohol which may help reduce symptoms    -     Magnesium; Future  -     Vitamin B12; Future  Vitamin D deficiency  -     Vitamin D 25 Hydroxy; Future  Screening PSA (prostate specific antigen)  -     PSA Screening; Future    Elevated blood pressure without hypertension  Advised patient to cut back on salt and try to lose 10 pounds and send home blood pressure readings  ----  Anxiety  -   Related to flying. Patient states prescription of Lorazepam usually lasts a year. Elevated MCV  Saw Dr. Hayley Parker. Patient's B12 level was normal.  He does drink 2 alcoholic drinks per day so encouraged him to cut back. He also saw Dr Hayley Parker regarding elevated ferritin which led to a hemochromatosis studies showing patient was heterozygous. Recommendations for Preventive Services Due: see orders and patient instructions/AVS.  Recommended screening schedule for the next 5-10 years is provided to the patient in written form: see Patient Instructions/AVS.           Return in about 1 year (around 5/10/2024). Subjective     Patient is here for routine visit. He is enjoying [employer] and looking after his grandchildren twice per week. He and his wife are enjoying bike riding. He has been doing physical therapy regarding his neck and this is improving. Patient's complete Health Risk Assessment and screening values have been reviewed and are found in Flowsheets. The following problems were reviewed today and where indicated follow up appointments were made and/or referrals ordered.     Positive Risk

## 2023-07-07 RX ORDER — ROSUVASTATIN CALCIUM 10 MG/1
10 TABLET, COATED ORAL NIGHTLY
Qty: 90 TABLET | Refills: 1 | Status: SHIPPED | OUTPATIENT
Start: 2023-07-07

## 2023-07-07 RX ORDER — SILDENAFIL 100 MG/1
100 TABLET, FILM COATED ORAL DAILY PRN
Qty: 30 TABLET | Refills: 0 | Status: SHIPPED | OUTPATIENT
Start: 2023-07-07 | End: 2023-08-06

## 2023-07-10 RX ORDER — SILDENAFIL 100 MG/1
100 TABLET, FILM COATED ORAL DAILY PRN
Qty: 30 TABLET | Refills: 0 | OUTPATIENT
Start: 2023-07-10 | End: 2023-08-09

## 2023-07-14 RX ORDER — OMEPRAZOLE 20 MG/1
CAPSULE, DELAYED RELEASE ORAL
Qty: 90 CAPSULE | Refills: 3 | Status: SHIPPED | OUTPATIENT
Start: 2023-07-14

## 2023-07-23 ENCOUNTER — PATIENT MESSAGE (OUTPATIENT)
Dept: INTERNAL MEDICINE CLINIC | Age: 68
End: 2023-07-23

## 2023-07-24 RX ORDER — SILDENAFIL 100 MG/1
100 TABLET, FILM COATED ORAL DAILY PRN
Qty: 30 TABLET | Refills: 0 | Status: SHIPPED | OUTPATIENT
Start: 2023-07-24 | End: 2023-08-23

## 2023-07-24 NOTE — TELEPHONE ENCOUNTER
From: Valeria Ying  To: Dr. Erinn Riley: 7/23/2023 12:18 PM EDT  Subject: [facility] Rx request    My previous request for Sidenafil was denied by [facility]. I am requesting that it be refilled at the [facility]). Thank you.  Selwyn Bautista

## 2023-09-11 RX ORDER — SILDENAFIL 100 MG/1
100 TABLET, FILM COATED ORAL DAILY PRN
Qty: 30 TABLET | Refills: 0 | Status: SHIPPED | OUTPATIENT
Start: 2023-09-11 | End: 2023-10-11

## 2023-10-23 RX ORDER — SILDENAFIL 100 MG/1
100 TABLET, FILM COATED ORAL DAILY PRN
Qty: 90 TABLET | Refills: 0 | Status: SHIPPED | OUTPATIENT
Start: 2023-10-23 | End: 2024-01-21

## 2023-10-23 NOTE — TELEPHONE ENCOUNTER
Last appointment: 5/10/2023  Next appointment: 5/13/2024  Last refill: 9/11/23  90 day supply requested

## 2023-12-12 RX ORDER — ROSUVASTATIN CALCIUM 10 MG/1
10 TABLET, COATED ORAL NIGHTLY
Qty: 90 TABLET | Refills: 1 | Status: SHIPPED | OUTPATIENT
Start: 2023-12-12

## 2023-12-12 NOTE — TELEPHONE ENCOUNTER
Medication:   Requested Prescriptions     Pending Prescriptions Disp Refills    rosuvastatin (CRESTOR) 10 MG tablet [Pharmacy Med Name: ROSUVASTATIN TAB 10MG] 90 tablet 1     Sig: TAKE 1 TABLET NIGHTLY     Last Filled:  # 90 with 1 refill on 7/7/23    Last appt: 5/10/2023   Next appt: 5/13/2024    Last OARRS:       8/27/2015     9:04 AM   RX Monitoring   Attestation The Prescription Monitoring Report for this patient was reviewed today.

## 2024-05-10 SDOH — HEALTH STABILITY: PHYSICAL HEALTH: ON AVERAGE, HOW MANY DAYS PER WEEK DO YOU ENGAGE IN MODERATE TO STRENUOUS EXERCISE (LIKE A BRISK WALK)?: 4 DAYS

## 2024-05-10 SDOH — ECONOMIC STABILITY: FOOD INSECURITY: WITHIN THE PAST 12 MONTHS, THE FOOD YOU BOUGHT JUST DIDN'T LAST AND YOU DIDN'T HAVE MONEY TO GET MORE.: NEVER TRUE

## 2024-05-10 SDOH — ECONOMIC STABILITY: FOOD INSECURITY: WITHIN THE PAST 12 MONTHS, YOU WORRIED THAT YOUR FOOD WOULD RUN OUT BEFORE YOU GOT MONEY TO BUY MORE.: NEVER TRUE

## 2024-05-10 SDOH — ECONOMIC STABILITY: INCOME INSECURITY: HOW HARD IS IT FOR YOU TO PAY FOR THE VERY BASICS LIKE FOOD, HOUSING, MEDICAL CARE, AND HEATING?: NOT HARD AT ALL

## 2024-05-10 SDOH — HEALTH STABILITY: PHYSICAL HEALTH: ON AVERAGE, HOW MANY MINUTES DO YOU ENGAGE IN EXERCISE AT THIS LEVEL?: 20 MIN

## 2024-05-10 ASSESSMENT — PATIENT HEALTH QUESTIONNAIRE - PHQ9
2. FEELING DOWN, DEPRESSED OR HOPELESS: NOT AT ALL
SUM OF ALL RESPONSES TO PHQ QUESTIONS 1-9: 0
1. LITTLE INTEREST OR PLEASURE IN DOING THINGS: NOT AT ALL
SUM OF ALL RESPONSES TO PHQ QUESTIONS 1-9: 0
SUM OF ALL RESPONSES TO PHQ9 QUESTIONS 1 & 2: 0

## 2024-05-10 ASSESSMENT — LIFESTYLE VARIABLES
HOW MANY STANDARD DRINKS CONTAINING ALCOHOL DO YOU HAVE ON A TYPICAL DAY: 3 OR 4
HOW OFTEN DURING THE LAST YEAR HAVE YOU NEEDED AN ALCOHOLIC DRINK FIRST THING IN THE MORNING TO GET YOURSELF GOING AFTER A NIGHT OF HEAVY DRINKING: NEVER
HOW OFTEN DURING THE LAST YEAR HAVE YOU BEEN UNABLE TO REMEMBER WHAT HAPPENED THE NIGHT BEFORE BECAUSE YOU HAD BEEN DRINKING: NEVER
HOW OFTEN DURING THE LAST YEAR HAVE YOU HAD A FEELING OF GUILT OR REMORSE AFTER DRINKING: NEVER
HOW MANY STANDARD DRINKS CONTAINING ALCOHOL DO YOU HAVE ON A TYPICAL DAY: 2
HAVE YOU OR SOMEONE ELSE BEEN INJURED AS A RESULT OF YOUR DRINKING: NO
HAVE YOU OR SOMEONE ELSE BEEN INJURED AS A RESULT OF YOUR DRINKING: NO
HOW OFTEN DO YOU HAVE SIX OR MORE DRINKS ON ONE OCCASION: 3
HAS A RELATIVE, FRIEND, DOCTOR, OR ANOTHER HEALTH PROFESSIONAL EXPRESSED CONCERN ABOUT YOUR DRINKING OR SUGGESTED YOU CUT DOWN: NO
HOW OFTEN DURING THE LAST YEAR HAVE YOU FOUND THAT YOU WERE NOT ABLE TO STOP DRINKING ONCE YOU HAD STARTED: NEVER
HOW OFTEN DURING THE LAST YEAR HAVE YOU FAILED TO DO WHAT WAS NORMALLY EXPECTED FROM YOU BECAUSE OF DRINKING: NEVER
HOW OFTEN DO YOU HAVE A DRINK CONTAINING ALCOHOL: 5
HOW OFTEN DO YOU HAVE A DRINK CONTAINING ALCOHOL: 4 OR MORE TIMES A WEEK
HOW OFTEN DURING THE LAST YEAR HAVE YOU HAD A FEELING OF GUILT OR REMORSE AFTER DRINKING: NEVER
HOW OFTEN DURING THE LAST YEAR HAVE YOU NEEDED AN ALCOHOLIC DRINK FIRST THING IN THE MORNING TO GET YOURSELF GOING AFTER A NIGHT OF HEAVY DRINKING: NEVER
HAS A RELATIVE, FRIEND, DOCTOR, OR ANOTHER HEALTH PROFESSIONAL EXPRESSED CONCERN ABOUT YOUR DRINKING OR SUGGESTED YOU CUT DOWN: NO
HOW OFTEN DURING THE LAST YEAR HAVE YOU FOUND THAT YOU WERE NOT ABLE TO STOP DRINKING ONCE YOU HAD STARTED: NEVER
HOW OFTEN DURING THE LAST YEAR HAVE YOU BEEN UNABLE TO REMEMBER WHAT HAPPENED THE NIGHT BEFORE BECAUSE YOU HAD BEEN DRINKING: NEVER
HOW OFTEN DURING THE LAST YEAR HAVE YOU FAILED TO DO WHAT WAS NORMALLY EXPECTED FROM YOU BECAUSE OF DRINKING: NEVER

## 2024-05-13 ENCOUNTER — OFFICE VISIT (OUTPATIENT)
Dept: INTERNAL MEDICINE CLINIC | Age: 69
End: 2024-05-13
Payer: MEDICARE

## 2024-05-13 VITALS
OXYGEN SATURATION: 97 % | DIASTOLIC BLOOD PRESSURE: 84 MMHG | HEIGHT: 69 IN | HEART RATE: 81 BPM | SYSTOLIC BLOOD PRESSURE: 150 MMHG | BODY MASS INDEX: 29.18 KG/M2 | WEIGHT: 197 LBS

## 2024-05-13 DIAGNOSIS — Z00.00 MEDICARE ANNUAL WELLNESS VISIT, SUBSEQUENT: Primary | ICD-10-CM

## 2024-05-13 DIAGNOSIS — R63.5 ABNORMAL WEIGHT GAIN: ICD-10-CM

## 2024-05-13 DIAGNOSIS — E55.9 VITAMIN D DEFICIENCY: ICD-10-CM

## 2024-05-13 DIAGNOSIS — Z12.5 SCREENING PSA (PROSTATE SPECIFIC ANTIGEN): ICD-10-CM

## 2024-05-13 DIAGNOSIS — F41.9 ANXIETY: ICD-10-CM

## 2024-05-13 DIAGNOSIS — D64.9 ANEMIA, UNSPECIFIED TYPE: ICD-10-CM

## 2024-05-13 DIAGNOSIS — D75.89 MACROCYTOSIS: ICD-10-CM

## 2024-05-13 DIAGNOSIS — M25.471 RIGHT ANKLE SWELLING: ICD-10-CM

## 2024-05-13 DIAGNOSIS — K21.00 GASTROESOPHAGEAL REFLUX DISEASE WITH ESOPHAGITIS WITHOUT HEMORRHAGE: ICD-10-CM

## 2024-05-13 DIAGNOSIS — E78.5 HYPERLIPIDEMIA, UNSPECIFIED HYPERLIPIDEMIA TYPE: ICD-10-CM

## 2024-05-13 DIAGNOSIS — I10 ESSENTIAL HYPERTENSION: ICD-10-CM

## 2024-05-13 DIAGNOSIS — R73.9 HYPERGLYCEMIA: ICD-10-CM

## 2024-05-13 LAB
25(OH)D3 SERPL-MCNC: 29.6 NG/ML
ALBUMIN SERPL-MCNC: 4.9 G/DL (ref 3.4–5)
ALBUMIN/GLOB SERPL: 1.7 {RATIO} (ref 1.1–2.2)
ALP SERPL-CCNC: 81 U/L (ref 40–129)
ALT SERPL-CCNC: 32 U/L (ref 10–40)
ANION GAP SERPL CALCULATED.3IONS-SCNC: 15 MMOL/L (ref 3–16)
AST SERPL-CCNC: 29 U/L (ref 15–37)
BILIRUB SERPL-MCNC: 0.8 MG/DL (ref 0–1)
BUN SERPL-MCNC: 20 MG/DL (ref 7–20)
CALCIUM SERPL-MCNC: 9.6 MG/DL (ref 8.3–10.6)
CHLORIDE SERPL-SCNC: 103 MMOL/L (ref 99–110)
CHOLEST SERPL-MCNC: 152 MG/DL (ref 0–199)
CO2 SERPL-SCNC: 24 MMOL/L (ref 21–32)
CREAT SERPL-MCNC: 0.7 MG/DL (ref 0.8–1.3)
DEPRECATED RDW RBC AUTO: 13.8 % (ref 12.4–15.4)
GFR SERPLBLD CREATININE-BSD FMLA CKD-EPI: >90 ML/MIN/{1.73_M2}
GLUCOSE SERPL-MCNC: 102 MG/DL (ref 70–99)
HCT VFR BLD AUTO: 36.7 % (ref 40.5–52.5)
HDLC SERPL-MCNC: 67 MG/DL (ref 40–60)
HGB BLD-MCNC: 13.1 G/DL (ref 13.5–17.5)
LDLC SERPL CALC-MCNC: 74 MG/DL
MAGNESIUM SERPL-MCNC: 2.1 MG/DL (ref 1.8–2.4)
MCH RBC QN AUTO: 39.5 PG (ref 26–34)
MCHC RBC AUTO-ENTMCNC: 35.6 G/DL (ref 31–36)
MCV RBC AUTO: 110.9 FL (ref 80–100)
PATH INTERP BLD-IMP: YES
PLATELET # BLD AUTO: 204 K/UL (ref 135–450)
PMV BLD AUTO: 8.8 FL (ref 5–10.5)
POTASSIUM SERPL-SCNC: 4.6 MMOL/L (ref 3.5–5.1)
PROT SERPL-MCNC: 7.8 G/DL (ref 6.4–8.2)
PSA SERPL DL<=0.01 NG/ML-MCNC: 0.5 NG/ML (ref 0–4)
RBC # BLD AUTO: 3.31 M/UL (ref 4.2–5.9)
SODIUM SERPL-SCNC: 142 MMOL/L (ref 136–145)
TRIGL SERPL-MCNC: 57 MG/DL (ref 0–150)
TSH SERPL DL<=0.005 MIU/L-ACNC: 3.02 UIU/ML (ref 0.27–4.2)
URATE SERPL-MCNC: 5 MG/DL (ref 3.5–7.2)
VLDLC SERPL CALC-MCNC: 11 MG/DL
WBC # BLD AUTO: 4.1 K/UL (ref 4–11)

## 2024-05-13 PROCEDURE — G8419 CALC BMI OUT NRM PARAM NOF/U: HCPCS | Performed by: INTERNAL MEDICINE

## 2024-05-13 PROCEDURE — G8427 DOCREV CUR MEDS BY ELIG CLIN: HCPCS | Performed by: INTERNAL MEDICINE

## 2024-05-13 PROCEDURE — 1036F TOBACCO NON-USER: CPT | Performed by: INTERNAL MEDICINE

## 2024-05-13 PROCEDURE — 99214 OFFICE O/P EST MOD 30 MIN: CPT | Performed by: INTERNAL MEDICINE

## 2024-05-13 PROCEDURE — 3017F COLORECTAL CA SCREEN DOC REV: CPT | Performed by: INTERNAL MEDICINE

## 2024-05-13 PROCEDURE — 3077F SYST BP >= 140 MM HG: CPT | Performed by: INTERNAL MEDICINE

## 2024-05-13 PROCEDURE — 1123F ACP DISCUSS/DSCN MKR DOCD: CPT | Performed by: INTERNAL MEDICINE

## 2024-05-13 PROCEDURE — 3079F DIAST BP 80-89 MM HG: CPT | Performed by: INTERNAL MEDICINE

## 2024-05-13 PROCEDURE — G0438 PPPS, INITIAL VISIT: HCPCS | Performed by: INTERNAL MEDICINE

## 2024-05-13 RX ORDER — LORAZEPAM 1 MG/1
1 TABLET ORAL EVERY 6 HOURS PRN
COMMUNITY
Start: 2022-05-09 | End: 2024-05-13 | Stop reason: SDUPTHER

## 2024-05-13 RX ORDER — VALSARTAN 80 MG/1
80 TABLET ORAL DAILY
Qty: 30 TABLET | Refills: 3 | Status: SHIPPED | OUTPATIENT
Start: 2024-05-13

## 2024-05-13 RX ORDER — DICLOFENAC SODIUM 75 MG/1
TABLET, DELAYED RELEASE ORAL
COMMUNITY
Start: 2024-04-12

## 2024-05-13 RX ORDER — ACETAMINOPHEN/DIPHENHYDRAMINE 500MG-25MG
1 TABLET ORAL NIGHTLY PRN
Qty: 14 TABLET | Refills: 0
Start: 2024-05-13

## 2024-05-13 RX ORDER — LORAZEPAM 1 MG/1
1 TABLET ORAL EVERY 6 HOURS PRN
Qty: 30 TABLET | Refills: 0 | Status: SHIPPED | OUTPATIENT
Start: 2024-05-13 | End: 2024-05-23

## 2024-05-13 SDOH — ECONOMIC STABILITY: FOOD INSECURITY: WITHIN THE PAST 12 MONTHS, THE FOOD YOU BOUGHT JUST DIDN'T LAST AND YOU DIDN'T HAVE MONEY TO GET MORE.: NEVER TRUE

## 2024-05-13 SDOH — ECONOMIC STABILITY: INCOME INSECURITY: HOW HARD IS IT FOR YOU TO PAY FOR THE VERY BASICS LIKE FOOD, HOUSING, MEDICAL CARE, AND HEATING?: NOT HARD AT ALL

## 2024-05-13 SDOH — ECONOMIC STABILITY: FOOD INSECURITY: WITHIN THE PAST 12 MONTHS, YOU WORRIED THAT YOUR FOOD WOULD RUN OUT BEFORE YOU GOT MONEY TO BUY MORE.: NEVER TRUE

## 2024-05-13 ASSESSMENT — PATIENT HEALTH QUESTIONNAIRE - PHQ9
SUM OF ALL RESPONSES TO PHQ QUESTIONS 1-9: 0
SUM OF ALL RESPONSES TO PHQ9 QUESTIONS 1 & 2: 0
SUM OF ALL RESPONSES TO PHQ QUESTIONS 1-9: 0
2. FEELING DOWN, DEPRESSED OR HOPELESS: NOT AT ALL
1. LITTLE INTEREST OR PLEASURE IN DOING THINGS: NOT AT ALL
SUM OF ALL RESPONSES TO PHQ QUESTIONS 1-9: 0
SUM OF ALL RESPONSES TO PHQ QUESTIONS 1-9: 0

## 2024-05-13 ASSESSMENT — LIFESTYLE VARIABLES
HOW OFTEN DURING THE LAST YEAR HAVE YOU HAD A FEELING OF GUILT OR REMORSE AFTER DRINKING: NEVER
HOW OFTEN DO YOU HAVE A DRINK CONTAINING ALCOHOL: 4 OR MORE TIMES A WEEK
HOW OFTEN DURING THE LAST YEAR HAVE YOU NEEDED AN ALCOHOLIC DRINK FIRST THING IN THE MORNING TO GET YOURSELF GOING AFTER A NIGHT OF HEAVY DRINKING: NEVER
HOW MANY STANDARD DRINKS CONTAINING ALCOHOL DO YOU HAVE ON A TYPICAL DAY: 3 OR 4
HAS A RELATIVE, FRIEND, DOCTOR, OR ANOTHER HEALTH PROFESSIONAL EXPRESSED CONCERN ABOUT YOUR DRINKING OR SUGGESTED YOU CUT DOWN: NO
HOW OFTEN DURING THE LAST YEAR HAVE YOU FOUND THAT YOU WERE NOT ABLE TO STOP DRINKING ONCE YOU HAD STARTED: NEVER
HAVE YOU OR SOMEONE ELSE BEEN INJURED AS A RESULT OF YOUR DRINKING: NO
HOW OFTEN DURING THE LAST YEAR HAVE YOU FAILED TO DO WHAT WAS NORMALLY EXPECTED FROM YOU BECAUSE OF DRINKING: NEVER
HOW OFTEN DURING THE LAST YEAR HAVE YOU BEEN UNABLE TO REMEMBER WHAT HAPPENED THE NIGHT BEFORE BECAUSE YOU HAD BEEN DRINKING: NEVER

## 2024-05-13 NOTE — PROGRESS NOTES
Medicare Annual Wellness Visit    Jhoan Rodriguez is here for Medicare AWV    Assessment & Plan   Medicare annual wellness visit, subsequent  Hyperlipidemia, unspecified hyperlipidemia type  Stable on statin  -     Lipid Panel; Future  -     Comprehensive Metabolic Panel; Future  Gastroesophageal reflux disease with esophagitis without hemorrhage  Stable on omeprazole  -     Magnesium; Future  Screening PSA (prostate specific antigen)  -     PSA Screening; Future  Vitamin D deficiency  -     Vitamin D 25 Hydroxy; Future  Macrocytosis  -     CBC; Future  Abnormal weight gain  -     TSH; Future  Right ankle swelling  -     XR ANKLE RIGHT (MIN 3 VIEWS); Future  Cut back nsaids  Anxiety  -     LORazepam (ATIVAN) 1 MG tablet; Take 1 tablet by mouth every 6 hours as needed for Anxiety for up to 10 days. Only takes when he travels by airplane Max Daily Amount: 4 mg, Disp-30 tablet, R-0Normal  Essential hypertension  -     Uric Acid; Future  Start Diovan 80 mg daily, work on weight loss with intermittent fasting    Elevated MCV  Saw Dr. Ocampo.  Patient's B12 level was normal.  He does drink 2 alcoholic drinks per day so encouraged him to cut back. He also saw Dr Ocampo regarding elevated ferritin which led to a hemochromatosis studies showing patient was heterozygous  ---  Recommendations for Preventive Services Due: see orders and patient instructions/AVS.  Recommended screening schedule for the next 5-10 years is provided to the patient in written form: see Patient Instructions/AVS.     Return in about 4 weeks (around 6/10/2024).     Subjective     Left achilles tendonitis is improving, saw Salomón. Wakes up 2 in AM during his sleep and awake an hour, will 1/2 Tylenol PM  Does drink 2-3 glasses of wine in the evening.    Has seen Dermatology.    Bad about exercise except bicycle. Weight is up. Says struggling with keto program    Mild ankle swelling since vacation.     Patient's complete Health Risk Assessment and screening

## 2024-05-13 NOTE — PATIENT INSTRUCTIONS
Right ankle xray  [address]    See eye doctor    Start diovan  Intermittent fasting   9 Ways to Cut Back on Drinking  Maybe you've found yourself drinking more alcohol than you'd prefer. If you want to cut back, here are some ideas to try.    Think before you drink.  Do you really want a drink, or is it just a habit? If you're used to having a drink at a certain time, try doing something else then.     Look for substitutes.  Find some no-alcohol drinks that you enjoy, like flavored seltzer water, tea with honey, or tonic with a slice of lime. Or try alcohol-free beer or \"virgin\" cocktails (without the alcohol).     Drink more water.  Use water to quench your thirst. Drink a glass of water before you have any alcohol. Have another glass along with every drink or between drinks.     Shrink your drink.  For example, have a bottle of beer instead of a pint. Use a smaller glass for wine. Choose drinks with lower alcohol content (ABV%). Or use less liquor and more mixer in cocktails.     Slow down.  It's easy to drink quickly and without thinking about it. Pay attention, and make each drink last longer.     Do the math.  Total up how much you spend on alcohol each month. How much is that a year? If you cut back, what could you do with the money you save?     Take a break.  Choose a day or two each week when you won't drink at all. Notice how you feel on those days, physically and emotionally. How did you sleep? Do you feel better? Over time, add more break days.     Count calories.  Would you like to lose some weight? For some people that's a good motivator for cutting back. Figure out how many calories are in each drink. How many does that add up to in a day? In a week? In a month?     Practice saying no.  Be ready when someone offers you a drink. Try: \"Thanks, I've had enough.\" Or \"Thanks, but I'm cutting back.\" Or \"No, thanks. I feel better when I drink less.\"   Current as of: November 15, 2023

## 2024-05-15 DIAGNOSIS — D64.9 ANEMIA, UNSPECIFIED TYPE: ICD-10-CM

## 2024-05-15 DIAGNOSIS — D75.89 MACROCYTOSIS: ICD-10-CM

## 2024-05-15 DIAGNOSIS — R73.9 HYPERGLYCEMIA: ICD-10-CM

## 2024-05-15 LAB
EST. AVERAGE GLUCOSE BLD GHB EST-MCNC: 96.8 MG/DL
FERRITIN SERPL IA-MCNC: 297.2 NG/ML (ref 30–400)
HBA1C MFR BLD: 5 %
IRON SATN MFR SERPL: 74 % (ref 20–50)
IRON SERPL-MCNC: 164 UG/DL (ref 59–158)
PATH INTERP BLD-IMP: NORMAL
TIBC SERPL-MCNC: 221 UG/DL (ref 260–445)

## 2024-05-15 RX ORDER — SILDENAFIL 100 MG/1
100 TABLET, FILM COATED ORAL DAILY PRN
Qty: 90 TABLET | Refills: 0 | Status: SHIPPED | OUTPATIENT
Start: 2024-05-15 | End: 2024-08-13

## 2024-05-15 RX ORDER — ROSUVASTATIN CALCIUM 10 MG/1
10 TABLET, COATED ORAL NIGHTLY
Qty: 90 TABLET | Refills: 1 | Status: SHIPPED | OUTPATIENT
Start: 2024-05-15

## 2024-05-16 ENCOUNTER — TELEPHONE (OUTPATIENT)
Dept: INTERNAL MEDICINE CLINIC | Age: 69
End: 2024-05-16

## 2024-05-16 DIAGNOSIS — D75.89 MACROCYTOSIS: ICD-10-CM

## 2024-05-16 LAB
REASON FOR REJECTION: NORMAL
REJECTED TEST: NORMAL

## 2024-05-17 LAB
ALBUMIN SERPL ELPH-MCNC: 4 G/DL (ref 3.1–4.9)
ALPHA1 GLOB SERPL ELPH-MCNC: 0.2 G/DL (ref 0.2–0.4)
ALPHA2 GLOB SERPL ELPH-MCNC: 0.6 G/DL (ref 0.4–1.1)
B-GLOBULIN SERPL ELPH-MCNC: 1.1 G/DL (ref 0.9–1.6)
GAMMA GLOB SERPL ELPH-MCNC: 1.8 G/DL (ref 0.6–1.8)
PROT SERPL-MCNC: 7.7 G/DL (ref 6.4–8.2)
SPE/IFE INTERPRETATION: NORMAL

## 2024-05-17 NOTE — TELEPHONE ENCOUNTER
Called [facility] lab as there was a specimen rejection for the Retic lab. This is due to specimen being too old and it has to be within 24 hours. The Folate also has to be refrigerated so that was not collected and the B12 could also not be added on. Do you want me to tell the pt to go get the labs drawn today?   
Called patient and left him a voicemail for him to go to labs and apologized for this inconvenience and informed him to go to a [facility] lab at his earliest convenience.   
It looks like it was the retic order that was rejected.   Please advise if reorder needed.   I can update patient if so             Component 5/16/24 1234   Rejected Test RETIC   Reason for Rejection see below   Comment: Unable to perform testing due to the age of the specimen.  To perform testing the specimen will need to be recollected.  Old        
[facility] is calling in regards to rejected retec lab due to specimen being too old when they received it.     Please advise if re ordering lab is needed and call patient to advise.   
WDL

## 2024-05-20 DIAGNOSIS — D75.89 MACROCYTOSIS: ICD-10-CM

## 2024-05-20 LAB
FOLATE SERPL-MCNC: 16.46 NG/ML (ref 4.78–24.2)
HCT VFR BLD AUTO: 32.3 % (ref 40.5–52.5)
IMMATURE RETIC FRACT: 0.48 (ref 0.21–0.37)
RETICS # AUTO: 0.04 M/UL
RETICS/RBC NFR AUTO: 1.52 % (ref 0.5–2.18)
VIT B12 SERPL-MCNC: 670 PG/ML (ref 211–911)

## 2024-05-24 ENCOUNTER — TELEPHONE (OUTPATIENT)
Dept: INTERNAL MEDICINE CLINIC | Age: 69
End: 2024-05-24

## 2024-05-24 NOTE — TELEPHONE ENCOUNTER
----- Message from Francesca Thapa MD sent at 5/13/2024  8:15 AM EDT -----  Get notes from Salomón

## 2024-07-09 RX ORDER — OMEPRAZOLE 20 MG/1
CAPSULE, DELAYED RELEASE ORAL
Qty: 90 CAPSULE | Refills: 3 | Status: SHIPPED | OUTPATIENT
Start: 2024-07-09

## 2024-07-18 ENCOUNTER — TELEPHONE (OUTPATIENT)
Dept: INTERNAL MEDICINE CLINIC | Age: 69
End: 2024-07-18

## 2024-07-18 DIAGNOSIS — I10 ESSENTIAL HYPERTENSION: Primary | ICD-10-CM

## 2024-07-18 NOTE — TELEPHONE ENCOUNTER
Called patient to get him scheduled to fu htn per  encounter, per patient was in understanding to have the lab work done CBC with Auto Differential [[id number]] and that  would give him results and no need for appointment.     Please advise.

## 2024-07-19 NOTE — TELEPHONE ENCOUNTER
Due to FU start of Diovan for high blood pressure.  \"Return in about 4 weeks (around 6/10/2024)\"

## 2024-07-22 NOTE — TELEPHONE ENCOUNTER
KORY    Pt called back and doesn't think having a appt with Dr. Thapa is necessary to be seen for htn. Patient stated he hasn't taken any pills that was prescribed  BP is averaging around 129/79 after multiple testing and has lost 8lbs. Pt thinks he can get his BP down on his own without medication.

## 2024-07-27 ENCOUNTER — APPOINTMENT (OUTPATIENT)
Dept: GENERAL RADIOLOGY | Age: 69
End: 2024-07-27
Payer: MEDICARE

## 2024-07-27 ENCOUNTER — HOSPITAL ENCOUNTER (EMERGENCY)
Age: 69
Discharge: HOME | End: 2024-07-27
Attending: STUDENT IN AN ORGANIZED HEALTH CARE EDUCATION/TRAINING PROGRAM
Payer: MEDICARE

## 2024-07-27 VITALS
HEART RATE: 69 BPM | SYSTOLIC BLOOD PRESSURE: 127 MMHG | BODY MASS INDEX: 28.32 KG/M2 | RESPIRATION RATE: 13 BRPM | WEIGHT: 191.2 LBS | HEIGHT: 69 IN | OXYGEN SATURATION: 99 % | DIASTOLIC BLOOD PRESSURE: 65 MMHG

## 2024-07-27 DIAGNOSIS — T84.029A DISLOCATION OF HIP JOINT PROSTHESIS, INITIAL ENCOUNTER (HCC): Primary | ICD-10-CM

## 2024-07-27 DIAGNOSIS — Z96.649 DISLOCATION OF HIP JOINT PROSTHESIS, INITIAL ENCOUNTER (HCC): Primary | ICD-10-CM

## 2024-07-27 PROCEDURE — 2700000000 HC OXYGEN THERAPY PER DAY

## 2024-07-27 PROCEDURE — 6360000002 HC RX W HCPCS

## 2024-07-27 PROCEDURE — 2580000003 HC RX 258

## 2024-07-27 PROCEDURE — 94761 N-INVAS EAR/PLS OXIMETRY MLT: CPT

## 2024-07-27 PROCEDURE — 96374 THER/PROPH/DIAG INJ IV PUSH: CPT

## 2024-07-27 PROCEDURE — 73502 X-RAY EXAM HIP UNI 2-3 VIEWS: CPT

## 2024-07-27 PROCEDURE — 99285 EMERGENCY DEPT VISIT HI MDM: CPT

## 2024-07-27 PROCEDURE — 27250 TREAT HIP DISLOCATION: CPT

## 2024-07-27 RX ORDER — HYDROMORPHONE HYDROCHLORIDE 1 MG/ML
0.5 INJECTION, SOLUTION INTRAMUSCULAR; INTRAVENOUS; SUBCUTANEOUS
Status: COMPLETED | OUTPATIENT
Start: 2024-07-27 | End: 2024-07-27

## 2024-07-27 RX ORDER — METHOCARBAMOL 750 MG/1
750 TABLET, FILM COATED ORAL EVERY 8 HOURS PRN
Qty: 15 TABLET | Refills: 0 | Status: SHIPPED | OUTPATIENT
Start: 2024-07-27 | End: 2024-07-27

## 2024-07-27 RX ORDER — 0.9 % SODIUM CHLORIDE 0.9 %
1000 INTRAVENOUS SOLUTION INTRAVENOUS ONCE
Status: COMPLETED | OUTPATIENT
Start: 2024-07-27 | End: 2024-07-27

## 2024-07-27 RX ORDER — METHOCARBAMOL 750 MG/1
750 TABLET, FILM COATED ORAL EVERY 8 HOURS PRN
Qty: 15 TABLET | Refills: 0 | Status: SHIPPED | OUTPATIENT
Start: 2024-07-27 | End: 2024-08-06

## 2024-07-27 RX ADMIN — PROPOFOL 86.7 MG: 10 INJECTION, EMULSION INTRAVENOUS at 11:34

## 2024-07-27 RX ADMIN — SODIUM CHLORIDE 1000 ML: 9 INJECTION, SOLUTION INTRAVENOUS at 10:59

## 2024-07-27 RX ADMIN — HYDROMORPHONE HYDROCHLORIDE 0.5 MG: 1 INJECTION, SOLUTION INTRAMUSCULAR; INTRAVENOUS; SUBCUTANEOUS at 10:49

## 2024-07-27 ASSESSMENT — PAIN SCALES - GENERAL
PAINLEVEL_OUTOF10: 10
PAINLEVEL_OUTOF10: 9

## 2024-07-27 ASSESSMENT — PAIN DESCRIPTION - LOCATION: LOCATION: HIP

## 2024-07-27 ASSESSMENT — PAIN - FUNCTIONAL ASSESSMENT: PAIN_FUNCTIONAL_ASSESSMENT: 0-10

## 2024-07-27 ASSESSMENT — PAIN DESCRIPTION - DESCRIPTORS: DESCRIPTORS: ACHING

## 2024-07-27 ASSESSMENT — PAIN DESCRIPTION - ORIENTATION: ORIENTATION: RIGHT

## 2024-07-27 NOTE — DISCHARGE INSTRUCTIONS
You had an anterior hip dislocation which we successfully put back in place. Please use crutches and do not bear weight on your right leg, and keep the splint on until you follow up with orthopedics.     Return if you develop any of the following: recurrent pain or popping sensation suspicion for a repeat dislocation, new numbness, tingling, discoloration of the right leg

## 2024-07-27 NOTE — PROGRESS NOTES
ETCO2  Monitoring done for conscious sedation.  Patient is on 2 liters/min via nasal cannula for procedure.    Baseline information:  HR: 64 BP: 134/75  RR: 14 LOC: alert  ETCO2: 36 SpO2: 100    5 minutes after sedation administered:  HR: 64 BP: 116/63  RR: 17 LOC: lethargic  ETCO2: 20 SpO2:     Post-Procedure:  HR: 71 BP: 123/59  RR: 12 LOC: alert  ETCO2: 34 SpO2: 100  Tolerated well    Patient/caregiver was educated on the proper method of use:  Yes      Level of patient/caregiver understanding able to:   [x] Verbalize understanding   [] Demonstrate understanding       [] Teach back        [] Needs reinforcement       []  No available caregiver               []  Other:     Response to education:  Good

## 2024-07-27 NOTE — ED PROVIDER NOTES
[facility]  EMERGENCY DEPARTMENT ENCOUNTER          EM RESIDENT NOTE       Date of evaluation: 7/27/2024    Chief Complaint     Hip Pain      History of Present Illness     Jhoan Rodriguez is a 69 y.o. male with past medical history of right DELMY presenting to the emergency department for right hip pain.  Approximately 45 minutes ago was bending over and felt a pop in his right hip.  He has severe pain, bulge in the lateral aspect of his hip and difficulty bearing weight.  Has been using crutches to ambulate.  Pain is 10 out of 10.  He endorses some numbness in his groin and proximal thigh but normal sensation throughout the rest of the lower extremity.  Difficulty moving due to pain. Did not fall or sustain injury    MEDICAL DECISION MAKING / ASSESSMENT / PLAN     INITIAL VITALS: BP: 134/76,  , Pulse: 70, Respirations: 18, SpO2: 99 %    Jhoan Rodriguez is a 69 y.o. male w/ hx of R EDLMY presenting w/R hip pain & limited ROM after bending down. On exam NVI. Xrays demonstrated R anterior dislocation of the prostheses. R hip was reduced via closed reduction (hip flexion, axial traction & external rotation. Performed under conscious sedation. Post reduction xray w/ interval reduction & improved alignment. No obvious hardware complication. Remained NVI on post-procedure reassessment, moving hip spontaneously & pt advised to keep immobilized.   Do not have hip abduction brace so will place pt in Knee immobilizer & dc w/ crutches. Advised NWB & hip immobilization until f/u w/ ortho. Referral provided.     Is this patient to be included in the SEP-1 core measure? No Exclusion criteria - the patient is NOT to be included for SEP-1 Core Measure due to: Infection is not suspected    Medical Decision Making  Amount and/or Complexity of Data Reviewed  Radiology: ordered. Decision-making details documented in ED Course.    Risk  Prescription drug management.        This patient was also evaluated by the attending physician. All

## 2024-07-27 NOTE — ED PROVIDER NOTES
ED Attending Attestation Note     Date of evaluation: 7/27/2024    This patient was seen by the resident.  I have seen and examined the patient, agree with the workup, evaluation, management and diagnosis. The care plan has been discussed.  My assessment reveals a 69-year-old male with history of remote right DELMY 23 years ago who presents with a right hip dislocation after he bent over and felt a pop in the hip.  X-ray confirms dislocation.  He is neurovascularly intact in the extremity and has a strong DP pulse with normal range of motion of the foot and ankle.  Procedural sedation was performed with propofol and reduction was performed by the resident physician.  I was present for the entirety of the sedation and reduction.     Agustín Lomeli MD  07/27/24 1210

## 2024-07-29 SDOH — HEALTH STABILITY: PHYSICAL HEALTH: ON AVERAGE, HOW MANY MINUTES DO YOU ENGAGE IN EXERCISE AT THIS LEVEL?: 20 MIN

## 2024-07-29 SDOH — HEALTH STABILITY: PHYSICAL HEALTH: ON AVERAGE, HOW MANY DAYS PER WEEK DO YOU ENGAGE IN MODERATE TO STRENUOUS EXERCISE (LIKE A BRISK WALK)?: 5 DAYS

## 2024-08-01 ENCOUNTER — OFFICE VISIT (OUTPATIENT)
Age: 69
End: 2024-08-01
Payer: MEDICARE

## 2024-08-01 VITALS — HEIGHT: 69 IN | WEIGHT: 191 LBS | BODY MASS INDEX: 28.29 KG/M2

## 2024-08-01 DIAGNOSIS — T84.020A DISLOCATION OF INTERNAL RIGHT HIP PROSTHESIS, INITIAL ENCOUNTER (HCC): Primary | ICD-10-CM

## 2024-08-01 PROCEDURE — G8419 CALC BMI OUT NRM PARAM NOF/U: HCPCS | Performed by: ORTHOPAEDIC SURGERY

## 2024-08-01 PROCEDURE — 1123F ACP DISCUSS/DSCN MKR DOCD: CPT | Performed by: ORTHOPAEDIC SURGERY

## 2024-08-01 PROCEDURE — G8427 DOCREV CUR MEDS BY ELIG CLIN: HCPCS | Performed by: ORTHOPAEDIC SURGERY

## 2024-08-01 PROCEDURE — 99203 OFFICE O/P NEW LOW 30 MIN: CPT | Performed by: ORTHOPAEDIC SURGERY

## 2024-08-01 PROCEDURE — 3017F COLORECTAL CA SCREEN DOC REV: CPT | Performed by: ORTHOPAEDIC SURGERY

## 2024-08-01 PROCEDURE — 1036F TOBACCO NON-USER: CPT | Performed by: ORTHOPAEDIC SURGERY

## 2024-08-07 NOTE — ASSESSMENT & PLAN NOTE
Pt presents today to follow up on previous visit with periods and medication. Pt sent mychart this week asking, Autumn told me to order U/S and hcg but patient stated she just had those done.    status post ENT evaluation. No obstruction. Patient to be evaluated by SLP.

## 2024-08-12 ENCOUNTER — TELEPHONE (OUTPATIENT)
Dept: ORTHOPEDIC SURGERY | Age: 69
End: 2024-08-12

## 2024-08-12 NOTE — TELEPHONE ENCOUNTER
Faxing Patient Information     Facility Name: [facility]   Contact Name: Jhoan Rodriguez CARLEEN   Contact Number: [phone]   Facility Fax Number: [phone]      PLEASE FAX PT ORDER OVER TO REHAB @ [facility]     [phone]     PLEASE CALL Pt WHEN REF IS SENT SO HE CAN F/U AND MAKE HIS APPT's .

## 2024-08-29 DIAGNOSIS — I10 ESSENTIAL HYPERTENSION: ICD-10-CM

## 2024-08-29 DIAGNOSIS — D75.89 MACROCYTOSIS: ICD-10-CM

## 2024-08-30 DIAGNOSIS — N52.9 ERECTILE DYSFUNCTION, UNSPECIFIED ERECTILE DYSFUNCTION TYPE: Primary | ICD-10-CM

## 2024-08-30 LAB
ANION GAP SERPL CALCULATED.3IONS-SCNC: 11 MMOL/L (ref 3–16)
BASOPHILS # BLD: 0 K/UL (ref 0–0.2)
BASOPHILS NFR BLD: 0.6 %
BUN SERPL-MCNC: 16 MG/DL (ref 7–20)
CALCIUM SERPL-MCNC: 9.4 MG/DL (ref 8.3–10.6)
CHLORIDE SERPL-SCNC: 103 MMOL/L (ref 99–110)
CO2 SERPL-SCNC: 25 MMOL/L (ref 21–32)
CREAT SERPL-MCNC: 0.9 MG/DL (ref 0.8–1.3)
DEPRECATED RDW RBC AUTO: 14 % (ref 12.4–15.4)
EOSINOPHIL # BLD: 0.2 K/UL (ref 0–0.6)
EOSINOPHIL NFR BLD: 4.1 %
GFR SERPLBLD CREATININE-BSD FMLA CKD-EPI: >90 ML/MIN/{1.73_M2}
GLUCOSE SERPL-MCNC: 96 MG/DL (ref 70–99)
HCT VFR BLD AUTO: 33.2 % (ref 40.5–52.5)
HGB BLD-MCNC: 11.5 G/DL (ref 13.5–17.5)
LYMPHOCYTES # BLD: 1 K/UL (ref 1–5.1)
LYMPHOCYTES NFR BLD: 17.5 %
MCH RBC QN AUTO: 39.2 PG (ref 26–34)
MCHC RBC AUTO-ENTMCNC: 34.6 G/DL (ref 31–36)
MCV RBC AUTO: 113.3 FL (ref 80–100)
MONOCYTES # BLD: 0.5 K/UL (ref 0–1.3)
MONOCYTES NFR BLD: 8.3 %
NEUTROPHILS # BLD: 4.1 K/UL (ref 1.7–7.7)
NEUTROPHILS NFR BLD: 69.5 %
PATH INTERP BLD-IMP: NO
PLATELET # BLD AUTO: 232 K/UL (ref 135–450)
PMV BLD AUTO: 9 FL (ref 5–10.5)
POTASSIUM SERPL-SCNC: 4.4 MMOL/L (ref 3.5–5.1)
RBC # BLD AUTO: 2.93 M/UL (ref 4.2–5.9)
SODIUM SERPL-SCNC: 139 MMOL/L (ref 136–145)
WBC # BLD AUTO: 5.8 K/UL (ref 4–11)

## 2024-08-30 RX ORDER — SILDENAFIL 100 MG/1
100 TABLET, FILM COATED ORAL DAILY PRN
Qty: 90 TABLET | Refills: 0 | Status: SHIPPED | OUTPATIENT
Start: 2024-08-30 | End: 2024-11-28

## 2024-08-30 NOTE — TELEPHONE ENCOUNTER
Last appointment: 5/13/2024 for  AWV  Next appointment: Visit date not found  Last refill: 5/15/24    Requested Prescriptions     Pending Prescriptions Disp Refills    sildenafil (VIAGRA) 100 MG tablet [Pharmacy Med Name: SILDENAFIL 100 MG TABLET] 90 tablet 0     Sig: TAKE 1 TABLET BY MOUTH DAILY AS NEEDED FOR ERECTILE DYSFUNCTION

## 2024-10-28 RX ORDER — ROSUVASTATIN CALCIUM 10 MG/1
10 TABLET, COATED ORAL NIGHTLY
Qty: 90 TABLET | Refills: 1 | Status: SHIPPED | OUTPATIENT
Start: 2024-10-28

## 2024-10-28 NOTE — TELEPHONE ENCOUNTER
Last appointment: 5/13/2024  Next appointment: Visit date not found    Return in about 4 weeks (around 6/10/2024).  Last refill: 5/15/24  Requested Prescriptions     Pending Prescriptions Disp Refills    rosuvastatin (CRESTOR) 10 MG tablet [Pharmacy Med Name: ROSUVASTATIN TAB 10MG] 90 tablet 1     Sig: TAKE 1 TABLET NIGHTLY

## 2024-12-18 DIAGNOSIS — N52.9 ERECTILE DYSFUNCTION, UNSPECIFIED ERECTILE DYSFUNCTION TYPE: ICD-10-CM

## 2024-12-18 RX ORDER — SILDENAFIL 100 MG/1
100 TABLET, FILM COATED ORAL DAILY PRN
Qty: 90 TABLET | Refills: 0 | Status: SHIPPED | OUTPATIENT
Start: 2024-12-18 | End: 2025-03-18

## 2024-12-18 NOTE — TELEPHONE ENCOUNTER
Last appointment: 5/13/2024  Next appointment: Visit date not found    Return in about 4 weeks (around 6/10/2024).   Last refill:  3 months ago (8/30/2024) by Francesca Thapa MD   Requested Prescriptions     Pending Prescriptions Disp Refills    sildenafil (VIAGRA) 100 MG tablet 90 tablet 0     Sig: Take 1 tablet by mouth daily as needed for Erectile Dysfunction     Sent [employer] message to schedule due/overdue appointment.

## 2025-02-14 NOTE — TELEPHONE ENCOUNTER
Last appointment: 5/13/2024  Next appointment: Visit date not found  Return in about 4 weeks (around 6/10/2024).      Sent [employer] message to schedule due/overdue appointment.     Last refill: 7/9/24

## 2025-02-15 RX ORDER — ROSUVASTATIN CALCIUM 10 MG/1
10 TABLET, COATED ORAL NIGHTLY
Qty: 90 TABLET | Refills: 0 | Status: SHIPPED | OUTPATIENT
Start: 2025-02-15

## 2025-03-05 RX ORDER — OMEPRAZOLE 20 MG/1
20 CAPSULE, DELAYED RELEASE ORAL EVERY MORNING
Qty: 30 CAPSULE | Refills: 0 | Status: SHIPPED | OUTPATIENT
Start: 2025-03-05

## 2025-03-05 NOTE — TELEPHONE ENCOUNTER
Last appointment: 5/13/2024    Return in about 4 weeks (around 6/10/2024).  Next appointment: Visit date not found  Last refill: 02/15/2025  Sent [employer] message to schedule due/overdue appointment.

## 2025-04-03 DIAGNOSIS — N52.9 ERECTILE DYSFUNCTION, UNSPECIFIED ERECTILE DYSFUNCTION TYPE: ICD-10-CM

## 2025-04-03 RX ORDER — SILDENAFIL 100 MG/1
100 TABLET, FILM COATED ORAL DAILY PRN
Qty: 60 TABLET | Refills: 0 | Status: SHIPPED | OUTPATIENT
Start: 2025-04-03 | End: 2025-07-02

## 2025-04-03 NOTE — TELEPHONE ENCOUNTER
Last appointment: 5/13/2024  Next appointment: Visit date not found  Patient has appt to establish with new pcp at [facility], on 5/22/25  Pended enough to get pt to that appointment qty 60          Last refill: 12/18/24

## 2025-05-19 ENCOUNTER — PATIENT MESSAGE (OUTPATIENT)
Dept: INTERNAL MEDICINE CLINIC | Age: 70
End: 2025-05-19